# Patient Record
Sex: MALE | Race: WHITE | NOT HISPANIC OR LATINO | Employment: FULL TIME | URBAN - METROPOLITAN AREA
[De-identification: names, ages, dates, MRNs, and addresses within clinical notes are randomized per-mention and may not be internally consistent; named-entity substitution may affect disease eponyms.]

---

## 2017-05-08 ENCOUNTER — HOSPITAL ENCOUNTER (EMERGENCY)
Facility: HOSPITAL | Age: 32
Discharge: HOME | End: 2017-05-08
Attending: EMERGENCY MEDICINE | Admitting: EMERGENCY MEDICINE
Payer: COMMERCIAL

## 2017-05-08 ENCOUNTER — APPOINTMENT (EMERGENCY)
Dept: RADIOLOGY | Facility: HOSPITAL | Age: 32
End: 2017-05-08
Payer: COMMERCIAL

## 2017-05-08 VITALS
SYSTOLIC BLOOD PRESSURE: 129 MMHG | RESPIRATION RATE: 18 BRPM | OXYGEN SATURATION: 97 % | HEART RATE: 88 BPM | BODY MASS INDEX: 25.77 KG/M2 | TEMPERATURE: 98.9 F | DIASTOLIC BLOOD PRESSURE: 62 MMHG | HEIGHT: 70 IN | WEIGHT: 180 LBS

## 2017-05-08 DIAGNOSIS — S39.012A: Primary | ICD-10-CM

## 2017-05-08 PROCEDURE — 96375 TX/PRO/DX INJ NEW DRUG ADDON: CPT

## 2017-05-08 PROCEDURE — 99284 EMERGENCY DEPT VISIT MOD MDM: CPT

## 2017-05-08 PROCEDURE — 96361 HYDRATE IV INFUSION ADD-ON: CPT

## 2017-05-08 PROCEDURE — 96374 THER/PROPH/DIAG INJ IV PUSH: CPT

## 2017-05-08 PROCEDURE — 72131 CT LUMBAR SPINE W/O DYE: CPT

## 2017-05-08 RX ORDER — NAPROXEN 500 MG/1
500 TABLET ORAL 2 TIMES DAILY WITH MEALS
Qty: 10 TABLET | Refills: 0 | Status: SHIPPED | OUTPATIENT
Start: 2017-05-08 | End: 2019-01-31 | Stop reason: ALTCHOICE

## 2017-05-08 RX ORDER — OXYCODONE HYDROCHLORIDE AND ACETAMINOPHEN 5; 325 MG/1; MG/1
1 TABLET ORAL EVERY 6 HOURS PRN
Qty: 10 TABLET | Refills: 0 | Status: SHIPPED | OUTPATIENT
Start: 2017-05-08 | End: 2017-05-11

## 2017-05-08 RX ORDER — KETOROLAC TROMETHAMINE 30 MG/ML
30 INJECTION, SOLUTION INTRAMUSCULAR; INTRAVENOUS ONCE
Status: COMPLETED | OUTPATIENT
Start: 2017-05-08 | End: 2017-05-08

## 2017-05-08 RX ADMIN — SODIUM CHLORIDE 1000 ML: 0.9 INJECTION, SOLUTION INTRAVENOUS at 12:33

## 2017-05-08 RX ADMIN — KETOROLAC TROMETHAMINE 30 MG: 30 INJECTION, SOLUTION INTRAMUSCULAR at 12:36

## 2017-05-08 RX ADMIN — HYDROMORPHONE HYDROCHLORIDE 1 MG: 1 INJECTION, SOLUTION INTRAMUSCULAR; INTRAVENOUS; SUBCUTANEOUS at 12:37

## 2017-05-11 ENCOUNTER — HOSPITAL ENCOUNTER (EMERGENCY)
Facility: HOSPITAL | Age: 32
Discharge: HOME | End: 2017-05-11
Payer: COMMERCIAL

## 2017-05-11 ENCOUNTER — APPOINTMENT (EMERGENCY)
Dept: RADIOLOGY | Facility: HOSPITAL | Age: 32
End: 2017-05-11
Payer: COMMERCIAL

## 2017-05-11 VITALS
HEIGHT: 70 IN | BODY MASS INDEX: 25.77 KG/M2 | SYSTOLIC BLOOD PRESSURE: 158 MMHG | HEART RATE: 81 BPM | WEIGHT: 180 LBS | RESPIRATION RATE: 18 BRPM | DIASTOLIC BLOOD PRESSURE: 82 MMHG | TEMPERATURE: 98.6 F | OXYGEN SATURATION: 97 %

## 2017-05-11 DIAGNOSIS — S32.018A OTHER CLOSED FRACTURE OF FIRST LUMBAR VERTEBRA, INITIAL ENCOUNTER (HCC): Primary | ICD-10-CM

## 2017-05-11 DIAGNOSIS — S32.009A FRACTURE OF LUMBAR SPINE (HCC): ICD-10-CM

## 2017-05-11 LAB
ALBUMIN SERPL BCP-MCNC: 4.5 G/DL (ref 3.5–5)
ALP SERPL-CCNC: 80 U/L (ref 46–116)
ALT SERPL W P-5'-P-CCNC: 40 U/L (ref 12–78)
ANION GAP SERPL CALCULATED.3IONS-SCNC: 10 MMOL/L (ref 4–13)
APTT PPP: 30 SECONDS (ref 24–33)
AST SERPL W P-5'-P-CCNC: 18 U/L (ref 5–45)
BASOPHILS # BLD AUTO: 0 THOUSANDS/ΜL (ref 0–0.1)
BASOPHILS NFR BLD AUTO: 1 % (ref 0–1)
BILIRUB SERPL-MCNC: 0.6 MG/DL (ref 0.2–1)
BUN SERPL-MCNC: 10 MG/DL (ref 5–25)
CALCIUM SERPL-MCNC: 9.1 MG/DL (ref 8.3–10.1)
CHLORIDE SERPL-SCNC: 104 MMOL/L (ref 100–108)
CO2 SERPL-SCNC: 26 MMOL/L (ref 21–32)
CREAT SERPL-MCNC: 0.79 MG/DL (ref 0.6–1.3)
EOSINOPHIL # BLD AUTO: 0.2 THOUSAND/ΜL (ref 0–0.61)
EOSINOPHIL NFR BLD AUTO: 3 % (ref 0–6)
ERYTHROCYTE [DISTWIDTH] IN BLOOD BY AUTOMATED COUNT: 14 % (ref 11.6–15.1)
GFR SERPL CREATININE-BSD FRML MDRD: >60 ML/MIN/1.73SQ M
GLUCOSE SERPL-MCNC: 96 MG/DL (ref 65–140)
HCT VFR BLD AUTO: 46.4 % (ref 42–52)
HGB BLD-MCNC: 15.6 G/DL (ref 14–18)
INR PPP: 1.08 (ref 0.86–1.16)
LYMPHOCYTES # BLD AUTO: 1.2 THOUSANDS/ΜL (ref 0.6–4.47)
LYMPHOCYTES NFR BLD AUTO: 22 % (ref 14–44)
MCH RBC QN AUTO: 30.3 PG (ref 27–31)
MCHC RBC AUTO-ENTMCNC: 33.6 G/DL (ref 31.4–37.4)
MCV RBC AUTO: 90 FL (ref 82–98)
MONOCYTES # BLD AUTO: 0.4 THOUSAND/ΜL (ref 0.17–1.22)
MONOCYTES NFR BLD AUTO: 8 % (ref 4–12)
NEUTROPHILS # BLD AUTO: 3.5 THOUSANDS/ΜL (ref 1.85–7.62)
NEUTS SEG NFR BLD AUTO: 66 % (ref 43–75)
NRBC BLD AUTO-RTO: 0 /100 WBCS
PLATELET # BLD AUTO: 254 THOUSANDS/UL (ref 130–400)
PMV BLD AUTO: 9.5 FL (ref 8.9–12.7)
POTASSIUM SERPL-SCNC: 4.1 MMOL/L (ref 3.5–5.3)
PROT SERPL-MCNC: 7.5 G/DL (ref 6.4–8.2)
PROTHROMBIN TIME: 11.4 SECONDS (ref 9.4–11.7)
RBC # BLD AUTO: 5.15 MILLION/UL (ref 4.7–6.1)
SODIUM SERPL-SCNC: 140 MMOL/L (ref 136–145)
WBC # BLD AUTO: 5.3 THOUSAND/UL (ref 4.8–10.8)

## 2017-05-11 PROCEDURE — 80053 COMPREHEN METABOLIC PANEL: CPT | Performed by: PHYSICIAN ASSISTANT

## 2017-05-11 PROCEDURE — 96374 THER/PROPH/DIAG INJ IV PUSH: CPT

## 2017-05-11 PROCEDURE — 99284 EMERGENCY DEPT VISIT MOD MDM: CPT

## 2017-05-11 PROCEDURE — 85610 PROTHROMBIN TIME: CPT | Performed by: PHYSICIAN ASSISTANT

## 2017-05-11 PROCEDURE — 36415 COLL VENOUS BLD VENIPUNCTURE: CPT | Performed by: PHYSICIAN ASSISTANT

## 2017-05-11 PROCEDURE — 85025 COMPLETE CBC W/AUTO DIFF WBC: CPT | Performed by: PHYSICIAN ASSISTANT

## 2017-05-11 PROCEDURE — 74177 CT ABD & PELVIS W/CONTRAST: CPT

## 2017-05-11 PROCEDURE — 96361 HYDRATE IV INFUSION ADD-ON: CPT

## 2017-05-11 PROCEDURE — 96375 TX/PRO/DX INJ NEW DRUG ADDON: CPT

## 2017-05-11 PROCEDURE — 85730 THROMBOPLASTIN TIME PARTIAL: CPT | Performed by: PHYSICIAN ASSISTANT

## 2017-05-11 RX ORDER — KETOROLAC TROMETHAMINE 30 MG/ML
30 INJECTION, SOLUTION INTRAMUSCULAR; INTRAVENOUS ONCE
Status: COMPLETED | OUTPATIENT
Start: 2017-05-11 | End: 2017-05-11

## 2017-05-11 RX ORDER — IBUPROFEN 800 MG/1
800 TABLET ORAL EVERY 6 HOURS PRN
Qty: 30 TABLET | Refills: 0 | Status: SHIPPED | OUTPATIENT
Start: 2017-05-11 | End: 2019-01-31 | Stop reason: ALTCHOICE

## 2017-05-11 RX ORDER — ONDANSETRON 4 MG/1
4 TABLET, ORALLY DISINTEGRATING ORAL EVERY 8 HOURS PRN
Qty: 20 TABLET | Refills: 0 | Status: SHIPPED | OUTPATIENT
Start: 2017-05-11 | End: 2019-01-31 | Stop reason: ALTCHOICE

## 2017-05-11 RX ORDER — ONDANSETRON 2 MG/ML
4 INJECTION INTRAMUSCULAR; INTRAVENOUS ONCE
Status: COMPLETED | OUTPATIENT
Start: 2017-05-11 | End: 2017-05-11

## 2017-05-11 RX ADMIN — KETOROLAC TROMETHAMINE 30 MG: 30 INJECTION, SOLUTION INTRAMUSCULAR at 11:52

## 2017-05-11 RX ADMIN — IOHEXOL 100 ML: 350 INJECTION, SOLUTION INTRAVENOUS at 12:19

## 2017-05-11 RX ADMIN — ONDANSETRON 4 MG: 2 INJECTION INTRAMUSCULAR; INTRAVENOUS at 11:51

## 2017-05-11 RX ADMIN — SODIUM CHLORIDE 1000 ML: 0.9 INJECTION, SOLUTION INTRAVENOUS at 11:19

## 2017-05-17 ENCOUNTER — GENERIC CONVERSION - ENCOUNTER (OUTPATIENT)
Dept: OTHER | Facility: OTHER | Age: 32
End: 2017-05-17

## 2017-05-17 ENCOUNTER — ALLSCRIPTS OFFICE VISIT (OUTPATIENT)
Dept: OTHER | Facility: OTHER | Age: 32
End: 2017-05-17

## 2018-01-13 VITALS
DIASTOLIC BLOOD PRESSURE: 78 MMHG | SYSTOLIC BLOOD PRESSURE: 130 MMHG | WEIGHT: 180 LBS | BODY MASS INDEX: 25.77 KG/M2 | HEIGHT: 70 IN

## 2018-12-14 ENCOUNTER — OFFICE VISIT (OUTPATIENT)
Dept: FAMILY MEDICINE CLINIC | Facility: CLINIC | Age: 33
End: 2018-12-14
Payer: COMMERCIAL

## 2018-12-14 VITALS
DIASTOLIC BLOOD PRESSURE: 70 MMHG | TEMPERATURE: 97.8 F | SYSTOLIC BLOOD PRESSURE: 142 MMHG | WEIGHT: 213 LBS | HEART RATE: 92 BPM | BODY MASS INDEX: 30.56 KG/M2 | RESPIRATION RATE: 14 BRPM

## 2018-12-14 DIAGNOSIS — Z79.899 DRUG THERAPY: ICD-10-CM

## 2018-12-14 DIAGNOSIS — L30.8 OTHER ECZEMA: ICD-10-CM

## 2018-12-14 DIAGNOSIS — F10.20 ALCOHOL DEPENDENCE, CONTINUOUS (HCC): ICD-10-CM

## 2018-12-14 DIAGNOSIS — Z13.220 LIPID SCREENING: ICD-10-CM

## 2018-12-14 DIAGNOSIS — K42.9 UMBILICAL HERNIA WITHOUT OBSTRUCTION AND WITHOUT GANGRENE: ICD-10-CM

## 2018-12-14 DIAGNOSIS — F41.8 DEPRESSION WITH ANXIETY: Primary | ICD-10-CM

## 2018-12-14 DIAGNOSIS — F10.982 ALCOHOL-INDUCED INSOMNIA (HCC): ICD-10-CM

## 2018-12-14 DIAGNOSIS — E55.9 VITAMIN D DEFICIENCY: ICD-10-CM

## 2018-12-14 DIAGNOSIS — Z77.018 HEAVY METAL EXPOSURE: ICD-10-CM

## 2018-12-14 DIAGNOSIS — Z13.1 DIABETES MELLITUS SCREENING: ICD-10-CM

## 2018-12-14 DIAGNOSIS — Z13.0 SCREENING FOR DEFICIENCY ANEMIA: ICD-10-CM

## 2018-12-14 DIAGNOSIS — F43.10 PTSD (POST-TRAUMATIC STRESS DISORDER): ICD-10-CM

## 2018-12-14 PROCEDURE — 99203 OFFICE O/P NEW LOW 30 MIN: CPT | Performed by: NURSE PRACTITIONER

## 2018-12-14 RX ORDER — BETAMETHASONE DIPROPIONATE 0.5 MG/G
CREAM TOPICAL 2 TIMES DAILY
Qty: 30 G | Refills: 0 | Status: SHIPPED | OUTPATIENT
Start: 2018-12-14 | End: 2019-09-03 | Stop reason: SDUPTHER
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
## 2018-12-14 NOTE — LETTER
December 14, 2018     Patient: Velia Weiner   YOB: 1985   Date of Visit: 12/14/2018       To Whom it May Concern:    Titi Staples is under my professional care  He was seen in my office on 12/14/2018  He may return to work on 12/17/18  Excused 12/12/18 and 12/14/18  If you have any questions or concerns, please don't hesitate to call           Sincerely,          MEILSA Roque        CC: No Recipients

## 2019-01-31 ENCOUNTER — CONSULT (OUTPATIENT)
Dept: SURGERY | Facility: CLINIC | Age: 34
End: 2019-01-31
Payer: COMMERCIAL

## 2019-01-31 VITALS
SYSTOLIC BLOOD PRESSURE: 138 MMHG | BODY MASS INDEX: 32.05 KG/M2 | HEART RATE: 80 BPM | WEIGHT: 216.4 LBS | HEIGHT: 69 IN | DIASTOLIC BLOOD PRESSURE: 88 MMHG

## 2019-01-31 DIAGNOSIS — K42.9 UMBILICAL HERNIA WITHOUT OBSTRUCTION AND WITHOUT GANGRENE: ICD-10-CM

## 2019-01-31 DIAGNOSIS — Z01.818 PREOPERATIVE EXAMINATION: Primary | ICD-10-CM

## 2019-01-31 PROCEDURE — 99244 OFF/OP CNSLTJ NEW/EST MOD 40: CPT | Performed by: SURGERY

## 2019-01-31 RX ORDER — CEFAZOLIN SODIUM 2 G/50ML
2000 SOLUTION INTRAVENOUS ONCE
Status: CANCELLED | OUTPATIENT
Start: 2019-01-31 | End: 2019-01-31

## 2019-01-31 NOTE — H&P
[facility] History and Physical Note:    Assessment:  Umbilical hernia, symptomatic  Plan:  Open umbilical hernia repair  Chief Complaint:  I am here for the hernia  HPI  Patient is a very pleasant 16-year-old male who reported a bad coughing episode a couple years ago and felt a pop at his umbilicus  Reports a slowly enlarging bulge at his umbilicus since then  He works at a [employer] and lifts heavy materials  He notices some aching at the umbilical hernia site at the end of the day  A CT scan performed in [facility] revealed umbilical hernia with a small fascial defect  Patient tolerated a regular diet with normal bowel function  Has no recent illnesses or previous abdominal surgeries  PMH:  Past Medical History:   Diagnosis Date    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     Onset date: 7/30/2010     Lyme disease     Onset date: 2/1/1795     Uncomplicated alcohol abuse     Resolved 9/24/2014        PSH:  Past Surgical History:   Procedure Laterality Date    APPENDECTOMY      SINUS SURGERY      TONSILLECTOMY         Home Meds:  Current Outpatient Prescriptions on File Prior to Visit   Medication Sig Dispense Refill    betamethasone, augmented, (DIPROLENE-AF) 0 05 % cream Apply topically 2 (two) times a day 30 g 0    [DISCONTINUED] ibuprofen (MOTRIN) 800 mg tablet Take 1 tablet by mouth every 6 (six) hours as needed for mild pain for up to 30 days 30 tablet 0    [DISCONTINUED] naproxen (NAPROSYN) 500 mg tablet Take 1 tablet by mouth 2 (two) times a day with meals for 5 days 10 tablet 0    [DISCONTINUED] ondansetron (ZOFRAN-ODT) 4 mg disintegrating tablet Take 1 tablet by mouth every 8 (eight) hours as needed for nausea or vomiting for up to 30 days 20 tablet 0     No current facility-administered medications on file prior to visit          Allergies:  No Known Allergies    Social Hx:  Social History     Social History    Marital status: Single     Spouse name: N/A    Number of children: N/A    Years of education: N/A     Occupational History    Not on file  Social History Main Topics    Smoking status: Current Every Day Smoker     Packs/day: 1 00    Smokeless tobacco: Never Used    Alcohol use 4 2 oz/week     7 Shots of liquor per week    Drug use: No    Sexual activity: Not on file     Other Topics Concern    Not on file     Social History Narrative    History of cigarette smoker - As per [employer]    Former smoker - As per [employer]    Marijuana - As per [employer]         Family Hx:    No family history on file  Review of Systems   Constitutional: Negative  HENT: Negative  Eyes: Negative  Respiratory:        Viral upper respiratory infection 2 weeks ago   Cardiovascular: Negative  Gastrointestinal: Negative  Endocrine: Negative  Genitourinary: Negative  Musculoskeletal: Negative  Skin: Negative  Allergic/Immunologic: Negative  Neurological: Negative  Hematological: Negative  Psychiatric/Behavioral: Negative  /88 (BP Location: Left arm, Patient Position: Sitting, Cuff Size: Adult)   Pulse 80   Ht 5' 9" (1 753 m)   Wt 98 2 kg (216 lb 6 4 oz)   BMI 31 96 kg/m²      Physical Exam   Constitutional: He is oriented to person, place, and time  He appears well-developed and well-nourished  No distress  HENT:   Head: Normocephalic and atraumatic  Eyes: Pupils are equal, round, and reactive to light  Conjunctivae are normal    Neck: Normal range of motion  Neck supple  Cardiovascular: Normal rate and regular rhythm  No murmur heard  Pulmonary/Chest: Effort normal and breath sounds normal  No respiratory distress  Abdominal: Soft  Bowel sounds are normal  He exhibits no distension  There is no tenderness  Small umbilical hernia with omentum  Only partially reducible  Musculoskeletal: Normal range of motion  Lymphadenopathy:     He has no cervical adenopathy     Neurological: He is alert and oriented to person, place, and time  Skin: Skin is warm and dry  Psychiatric: He has a normal mood and affect   His behavior is normal        Pertinent labs reviewed    Pertinent images and available reads personally reviewed    Pertinent notes reviewed       Stacy Wilhelm MD [id number] [facility]  [phone]

## 2019-01-31 NOTE — PROGRESS NOTES
[facility] History and Physical Note:    Assessment:  Umbilical hernia, symptomatic  Plan:  Open umbilical hernia repair  Chief Complaint:  I am here for the hernia  HPI  Patient is a very pleasant 41-year-old male who reported a bad coughing episode a couple years ago and felt a pop at his umbilicus  Reports a slowly enlarging bulge at his umbilicus since then  He works at a [employer] and lifts heavy materials  He notices some aching at the umbilical hernia site at the end of the day  A CT scan performed in 4090 revealed umbilical hernia with a small fascial defect  Patient tolerated a regular diet with normal bowel function  Has no recent illnesses or previous abdominal surgeries  PMH:  Past Medical History:   Diagnosis Date    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     Onset date: 7/30/2010     Lyme disease     Onset date: 0/6/6099     Uncomplicated alcohol abuse     Resolved 9/24/2014        PSH:  Past Surgical History:   Procedure Laterality Date    APPENDECTOMY      SINUS SURGERY      TONSILLECTOMY         Home Meds:  Current Outpatient Prescriptions on File Prior to Visit   Medication Sig Dispense Refill    betamethasone, augmented, (DIPROLENE-AF) 0 05 % cream Apply topically 2 (two) times a day 30 g 0    [DISCONTINUED] ibuprofen (MOTRIN) 800 mg tablet Take 1 tablet by mouth every 6 (six) hours as needed for mild pain for up to 30 days 30 tablet 0    [DISCONTINUED] naproxen (NAPROSYN) 500 mg tablet Take 1 tablet by mouth 2 (two) times a day with meals for 5 days 10 tablet 0    [DISCONTINUED] ondansetron (ZOFRAN-ODT) 4 mg disintegrating tablet Take 1 tablet by mouth every 8 (eight) hours as needed for nausea or vomiting for up to 30 days 20 tablet 0     No current facility-administered medications on file prior to visit          Allergies:  No Known Allergies    Social Hx:  Social History     Social History    Marital status: Single     Spouse name: N/A    Number of children: N/A    Years of education: N/A     Occupational History    Not on file  Social History Main Topics    Smoking status: Current Every Day Smoker     Packs/day: 1 00    Smokeless tobacco: Never Used    Alcohol use 4 2 oz/week     7 Shots of liquor per week    Drug use: No    Sexual activity: Not on file     Other Topics Concern    Not on file     Social History Narrative    History of cigarette smoker - As per [employer]    Former smoker - As per [employer]    Marijuana - As per [employer]         Family Hx:    No family history on file  Review of Systems   Constitutional: Negative  HENT: Negative  Eyes: Negative  Respiratory:        Viral upper respiratory infection 2 weeks ago   Cardiovascular: Negative  Gastrointestinal: Negative  Endocrine: Negative  Genitourinary: Negative  Musculoskeletal: Negative  Skin: Negative  Allergic/Immunologic: Negative  Neurological: Negative  Hematological: Negative  Psychiatric/Behavioral: Negative  /88 (BP Location: Left arm, Patient Position: Sitting, Cuff Size: Adult)   Pulse 80   Ht 5' 9" (1 753 m)   Wt 98 2 kg (216 lb 6 4 oz)   BMI 31 96 kg/m²     Physical Exam   Constitutional: He is oriented to person, place, and time  He appears well-developed and well-nourished  No distress  HENT:   Head: Normocephalic and atraumatic  Eyes: Pupils are equal, round, and reactive to light  Conjunctivae are normal    Neck: Normal range of motion  Neck supple  Cardiovascular: Normal rate and regular rhythm  No murmur heard  Pulmonary/Chest: Effort normal and breath sounds normal  No respiratory distress  Abdominal: Soft  Bowel sounds are normal  He exhibits no distension  There is no tenderness  Small umbilical hernia with omentum  Only partially reducible  Musculoskeletal: Normal range of motion  Lymphadenopathy:     He has no cervical adenopathy     Neurological: He is alert and oriented to person, place, and time  Skin: Skin is warm and dry  Psychiatric: He has a normal mood and affect   His behavior is normal        Pertinent labs reviewed    Pertinent images and available reads personally reviewed    Pertinent notes reviewed       Mayelin Washington MD [address] [facility]  [phone]

## 2019-02-02 ENCOUNTER — TELEPHONE (OUTPATIENT)
Dept: FAMILY MEDICINE CLINIC | Facility: CLINIC | Age: 34
End: 2019-02-02

## 2019-02-04 LAB
25(OH)D3+25(OH)D2 SERPL-MCNC: 15.1 NG/ML (ref 30–100)
ALBUMIN SERPL-MCNC: 5.2 G/DL (ref 3.5–5.5)
ALBUMIN/GLOB SERPL: 2.1 {RATIO} (ref 1.2–2.2)
ALP SERPL-CCNC: 70 IU/L (ref 39–117)
ALT SERPL-CCNC: 122 IU/L (ref 0–44)
ARSENIC BLD-MCNC: 6 UG/L (ref 2–23)
AST SERPL-CCNC: 83 IU/L (ref 0–40)
BILIRUB SERPL-MCNC: 0.7 MG/DL (ref 0–1.2)
BUN SERPL-MCNC: 11 MG/DL (ref 6–20)
BUN/CREAT SERPL: 13 (ref 9–20)
CALCIUM SERPL-MCNC: 10.3 MG/DL (ref 8.7–10.2)
CHLORIDE SERPL-SCNC: 100 MMOL/L (ref 96–106)
CHOLEST SERPL-MCNC: 236 MG/DL (ref 100–199)
CO2 SERPL-SCNC: 22 MMOL/L (ref 20–29)
CREAT SERPL-MCNC: 0.86 MG/DL (ref 0.76–1.27)
ERYTHROCYTE [DISTWIDTH] IN BLOOD BY AUTOMATED COUNT: 13.9 % (ref 12.3–15.4)
GLOBULIN SER-MCNC: 2.5 G/DL (ref 1.5–4.5)
GLUCOSE SERPL-MCNC: 118 MG/DL (ref 65–99)
HCT VFR BLD AUTO: 48 % (ref 37.5–51)
HDLC SERPL-MCNC: 84 MG/DL
HGB BLD-MCNC: 16.3 G/DL (ref 13–17.7)
LABCORP COMMENT: NORMAL
LDLC SERPL CALC-MCNC: 119 MG/DL (ref 0–99)
LEAD BLD-MCNC: NORMAL UG/DL (ref 0–4)
MCH RBC QN AUTO: 32.5 PG (ref 26.6–33)
MCHC RBC AUTO-ENTMCNC: 34 G/DL (ref 31.5–35.7)
MCV RBC AUTO: 96 FL (ref 79–97)
MERCURY BLD-MCNC: 1.1 UG/L (ref 0–14.9)
MICRODELETION SYND BLD/T FISH: NORMAL
PLATELET # BLD AUTO: 230 X10E3/UL (ref 150–379)
POTASSIUM SERPL-SCNC: 4.3 MMOL/L (ref 3.5–5.2)
PROT SERPL-MCNC: 7.7 G/DL (ref 6–8.5)
RBC # BLD AUTO: 5.02 X10E6/UL (ref 4.14–5.8)
SL AMB EGFR AFRICAN AMERICAN: 131 ML/MIN/1.73
SL AMB EGFR NON AFRICAN AMERICAN: 113 ML/MIN/1.73
SL AMB VLDL CHOLESTEROL CALC: 33 MG/DL (ref 5–40)
SODIUM SERPL-SCNC: 142 MMOL/L (ref 134–144)
TRIGL SERPL-MCNC: 163 MG/DL (ref 0–149)
TSH SERPL DL<=0.005 MIU/L-ACNC: 1.95 UIU/ML (ref 0.45–4.5)
WBC # BLD AUTO: 5.2 X10E3/UL (ref 3.4–10.8)

## 2019-02-13 ENCOUNTER — OFFICE VISIT (OUTPATIENT)
Dept: URGENT CARE | Facility: CLINIC | Age: 34
End: 2019-02-13
Payer: COMMERCIAL

## 2019-02-13 VITALS
HEART RATE: 82 BPM | WEIGHT: 214.8 LBS | HEIGHT: 70 IN | DIASTOLIC BLOOD PRESSURE: 70 MMHG | TEMPERATURE: 100.2 F | OXYGEN SATURATION: 100 % | BODY MASS INDEX: 30.75 KG/M2 | RESPIRATION RATE: 16 BRPM | SYSTOLIC BLOOD PRESSURE: 120 MMHG

## 2019-02-13 DIAGNOSIS — R68.89 INFLUENZA-LIKE SYMPTOMS: Primary | ICD-10-CM

## 2019-02-13 PROCEDURE — 99213 OFFICE O/P EST LOW 20 MIN: CPT | Performed by: PHYSICIAN ASSISTANT

## 2019-02-13 NOTE — PATIENT INSTRUCTIONS
Influenza   WHAT YOU NEED TO KNOW:   Influenza (the flu) is an infection caused by the influenza virus  The flu is easily spread when an infected person coughs, sneezes, or has close contact with others  You may be able to spread the flu to others for 1 week or longer after signs or symptoms appear  DISCHARGE INSTRUCTIONS:   Call 911 for any of the following:   · You have trouble breathing, and your lips look purple or blue  · You have a seizure  Return to the emergency department if:   · You are dizzy, or you are urinating less or not at all  · You have a headache with a stiff neck, and you feel tired or confused  · You have new pain or pressure in your chest     · Your symptoms, such as shortness of breath, vomiting, or diarrhea, get worse  · Your symptoms, such as fever and coughing, seem to get better, but then get worse  Contact your healthcare provider if:   · You have new muscle pain or weakness  · You have questions or concerns about your condition or care  Medicines: You may need any of the following:  · Acetaminophen  decreases pain and fever  It is available without a doctor's order  Ask how much to take and how often to take it  Follow directions  Acetaminophen can cause liver damage if not taken correctly  · NSAIDs , such as ibuprofen, help decrease swelling, pain, and fever  This medicine is available with or without a doctor's order  NSAIDs can cause stomach bleeding or kidney problems in certain people  If you take blood thinner medicine, always ask your healthcare provider if NSAIDs are safe for you  Always read the medicine label and follow directions  · Antivirals  help fight a viral infection  · Take your medicine as directed  Contact your healthcare provider if you think your medicine is not helping or if you have side effects  Tell him or her if you are allergic to any medicine  Keep a list of the medicines, vitamins, and herbs you take   Include the amounts, and when and why you take them  Bring the list or the pill bottles to follow-up visits  Carry your medicine list with you in case of an emergency  Rest  as much as you can to help you recover  Drink liquids as directed  to help prevent dehydration  Ask how much liquid to drink each day and which liquids are best for you  Prevent the spread of influenza:   · Wash your hands often  Use soap and water  Wash your hands after you use the bathroom, change a child's diapers, or sneeze  Wash your hands before you prepare or eat food  Use gel hand cleanser when soap and water are not available  Do not touch your eyes, nose, or mouth unless you have washed your hands first            · Cover your mouth when you sneeze or cough  Cough into a tissue or the bend of your arm  · Clean shared items with a germ-killing   Clean table surfaces, doorknobs, and light switches  Do not share towels, silverware, and dishes with people who are sick  Wash bed sheets, towels, silverware, and dishes with soap and water  · Wear a mask  over your mouth and nose if you are sick or are near anyone who is sick  · Stay away from others  if you are sick  · Influenza vaccine  helps prevent influenza (flu)  Everyone older than 6 months should get a yearly influenza vaccine  Get the vaccine as soon as it is available, usually in September or October each year  Follow up with your healthcare provider as directed:  Write down your questions so you remember to ask them during your visits  © 2017 [employer] [employer] Information is for End User's use only and may not be sold, redistributed or otherwise used for commercial purposes  All illustrations and images included in CareNotes® are the copyrighted property of [employer] , [employer]  or [employer]  The above information is an  only  It is not intended as medical advice for individual conditions or treatments   Talk to your doctor, nurse or pharmacist before following any medical regimen to see if it is safe and effective for you

## 2019-02-13 NOTE — LETTER
February 13, 2019     Patient: Brent Castro   YOB: 1985   Date of Visit: 2/13/2019       To Whom it May Concern:    Nevaeh Richmond was seen in my clinic on 2/13/2019  He may return to work on 2/18/2019  If you have any questions or concerns, please don't hesitate to call           Sincerely,          Toño Barros PA-C        CC: No Recipients

## 2019-02-13 NOTE — PROGRESS NOTES
[facility]        NAME: Maxine Silverman is a 28 y o  male  : 1985    MRN: [id number]  DATE: 2019  TIME: 9:14 AM    Assessment and Plan   Influenza-like symptoms [R68 89]  1  Influenza-like symptoms           Patient Instructions     Discussed condition with patient  His presentation strongly resembles influenza  We discussed options which include starting him on Tamiflu versus swabbing him for flu versus conservative management  The patient has already had symptoms for 2 days and he has a significant mental health history so we agreed to treat him for flu with combination of hydration, rest, fever management, use of OTC cold meds for symptomatic relief, quarantine for up to 5 days, and wearing mask if must go out  He will not be started on Tamiflu  Follow up with PCP in 3-5 days  Proceed to  ER if symptoms worsen  Chief Complaint     Chief Complaint   Patient presents with    Influenza     temp at Work Monday 102 2 body aches, chest congestion         History of Present Illness       Pt presents with onset two days ago of fever, chills, body aches, mildly productive cough, nasal congestion, diarrhea  Denies ST, N/V  His appetite is decreased  He has taken Advil  Denies hx of asthma/allergies  Does smoke 1 ppd  He does not receive the flu shot  Review of Systems   Review of Systems   Constitutional: Positive for chills and fever  HENT: Positive for congestion  Negative for sore throat  Respiratory: Positive for cough  Cardiovascular: Negative  Gastrointestinal: Positive for diarrhea  Negative for nausea and vomiting  Genitourinary: Negative  Musculoskeletal: Positive for myalgias           Current Medications       Current Outpatient Medications:     betamethasone, augmented, (DIPROLENE-AF) 0 05 % cream, Apply topically 2 (two) times a day, Disp: 30 g, Rfl: 0    Current Allergies     Allergies as of 2019    (No Known Allergies)            The following portions of the patient's history were reviewed and updated as appropriate: allergies, current medications, past family history, past medical history, past social history, past surgical history and problem list      Past Medical History:   Diagnosis Date    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     Onset date: 7/30/2010     Lyme disease     Onset date: 8/1/8384     Uncomplicated alcohol abuse     Resolved 9/24/2014        Past Surgical History:   Procedure Laterality Date    APPENDECTOMY      SINUS SURGERY      TONSILLECTOMY         History reviewed  No pertinent family history  Medications have been verified  Objective   /70   Pulse 82   Temp 100 2 °F (37 9 °C)   Resp 16   Ht 5' 9 5" (1 765 m)   Wt 97 4 kg (214 lb 12 8 oz)   SpO2 100%   BMI 31 27 kg/m²        Physical Exam     Physical Exam   Constitutional: He is oriented to person, place, and time  He appears well-developed and well-nourished  No distress  Patient appears ill   HENT:   Right Ear: Hearing, tympanic membrane, external ear and ear canal normal    Left Ear: Hearing, tympanic membrane, external ear and ear canal normal    Nose: Mucosal edema (Bilateral boggy turbinates) present  Mouth/Throat: Mucous membranes are normal  Posterior oropharyngeal erythema ( PND) present  No oropharyngeal exudate  Neck: Neck supple  Cardiovascular: Normal rate, regular rhythm and normal heart sounds  Pulmonary/Chest: Effort normal and breath sounds normal    Lymphadenopathy:     He has no cervical adenopathy  Neurological: He is alert and oriented to person, place, and time  Psychiatric: He has a normal mood and affect  Vitals reviewed

## 2019-05-09 ENCOUNTER — TELEPHONE (OUTPATIENT)
Dept: FAMILY MEDICINE CLINIC | Facility: CLINIC | Age: 34
End: 2019-05-09

## 2019-05-09 PROBLEM — F33.41 RECURRENT MAJOR DEPRESSIVE DISORDER, IN PARTIAL REMISSION (HCC): Status: ACTIVE | Noted: 2019-05-09

## 2019-08-12 ENCOUNTER — APPOINTMENT (OUTPATIENT)
Dept: RADIOLOGY | Facility: CLINIC | Age: 34
End: 2019-08-12
Payer: COMMERCIAL

## 2019-08-12 ENCOUNTER — OFFICE VISIT (OUTPATIENT)
Dept: URGENT CARE | Facility: CLINIC | Age: 34
End: 2019-08-12
Payer: COMMERCIAL

## 2019-08-12 VITALS
SYSTOLIC BLOOD PRESSURE: 140 MMHG | HEART RATE: 96 BPM | OXYGEN SATURATION: 96 % | TEMPERATURE: 100.4 F | RESPIRATION RATE: 18 BRPM | DIASTOLIC BLOOD PRESSURE: 80 MMHG | HEIGHT: 69 IN | BODY MASS INDEX: 33.06 KG/M2 | WEIGHT: 223.2 LBS

## 2019-08-12 DIAGNOSIS — M79.672 LEFT FOOT PAIN: Primary | ICD-10-CM

## 2019-08-12 DIAGNOSIS — M79.672 LEFT FOOT PAIN: ICD-10-CM

## 2019-08-12 PROCEDURE — 73630 X-RAY EXAM OF FOOT: CPT

## 2019-08-12 PROCEDURE — 99213 OFFICE O/P EST LOW 20 MIN: CPT | Performed by: FAMILY MEDICINE

## 2019-08-12 RX ORDER — DIPHENHYDRAMINE HCL 25 MG
25 TABLET ORAL EVERY 6 HOURS PRN
COMMUNITY
End: 2021-07-01

## 2019-08-12 NOTE — LETTER
August 12, 2019     Patient: Keith Second   YOB: 1985   Date of Visit: 8/12/2019       To Whom It May Concern:    Alonso Drop was evaluated here on 08/12/2019  Please excuse is absence  If you have any questions or concerns, please don't hesitate to call           Sincerely,        Agatha Joe MD    CC: No Recipients

## 2019-08-12 NOTE — PROGRESS NOTES
[facility]        NAME: Aixa Campbell is a 27 y o  male  : 1985    MRN: [id number]  DATE: 2019  TIME: 1:34 PM    Assessment and Plan   Left foot pain [M79 672]  1  Left foot pain  XR foot 3+ vw left     X-ray does not appear to show fracture of the 5th metatarsal   Tenderness concerning for chronic soft tissue injury from tight fitting boots  Patient encouraged to replace with better fitting shoes and to ice the area of pain  Will refer to Podiatry for further evaluation management for persistent symptoms  Patient Instructions     Follow up with PCP in 3-5 days  Proceed to  ER if symptoms worsen  Chief Complaint     Chief Complaint   Patient presents with    Foot Pain     L foot pain x 2 months - denies trauma/injury  Pain lateral L foot with occ  swelling  Using ice - no OTC meds  History of Present Illness       79-year-old male presents today due to 2 months of left foot pain  Denies any obvious trauma to the foot; unsure of the cause  Reports standing on concrete floors a lot while at work and wonders if pain is associated with that  Has a constant pain every day which is worsened after returning from work  Pain is usually 5/10 at that time  Has been icing to improved pain  Review of Systems   Review of Systems   Constitutional: Negative for chills and fever  Musculoskeletal: Positive for arthralgias and gait problem  Negative for joint swelling  Skin: Positive for color change (Mild ecchymosis)  Neurological: Positive for numbness (Left lateral foot)           Current Medications       Current Outpatient Medications:     betamethasone, augmented, (DIPROLENE-AF) 0 05 % cream, Apply topically 2 (two) times a day, Disp: 30 g, Rfl: 0    diphenhydrAMINE (BENADRYL) 25 mg tablet, Take 25 mg by mouth every 6 (six) hours as needed for itching, Disp: , Rfl:     Current Allergies     Allergies as of 2019    (No Known Allergies)            The following portions of the patient's history were reviewed and updated as appropriate: allergies, current medications, past family history, past medical history, past social history, past surgical history and problem list      Past Medical History:   Diagnosis Date    Anxiety     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     Onset date: 7/30/2010     Eczema     Lyme disease     Onset date: 6/0/0117     Uncomplicated alcohol abuse     Resolved 9/24/2014        Past Surgical History:   Procedure Laterality Date    APPENDECTOMY      SINUS SURGERY      TONSILLECTOMY         No family history on file  Medications have been verified  Objective   /80 (BP Location: Right arm, Patient Position: Sitting, Cuff Size: Large)   Pulse 96   Temp 100 4 °F (38 °C) (Tympanic)   Resp 18   Ht 5' 9" (1 753 m)   Wt 101 kg (223 lb 3 2 oz)   SpO2 96%   BMI 32 96 kg/m²        Physical Exam     Physical Exam   Constitutional: He is oriented to person, place, and time  He appears well-developed and well-nourished  He appears distressed (Moderate left foot pain)  HENT:   Head: Normocephalic and atraumatic  Eyes: Conjunctivae are normal    Pulmonary/Chest: Effort normal    Musculoskeletal: Normal range of motion  He exhibits tenderness (Point tenderness over the dorsal aspect of the 5th metatarsal midshaft )  He exhibits no edema or deformity  Neurological: He is alert and oriented to person, place, and time  Skin: Skin is warm  He is not diaphoretic  No erythema  Psychiatric: He has a normal mood and affect  His behavior is normal  Judgment and thought content normal    Nursing note and vitals reviewed

## 2019-09-03 ENCOUNTER — OFFICE VISIT (OUTPATIENT)
Dept: FAMILY MEDICINE CLINIC | Facility: CLINIC | Age: 34
End: 2019-09-03
Payer: COMMERCIAL

## 2019-09-03 VITALS
HEART RATE: 100 BPM | RESPIRATION RATE: 16 BRPM | DIASTOLIC BLOOD PRESSURE: 98 MMHG | HEIGHT: 69 IN | WEIGHT: 222.2 LBS | TEMPERATURE: 98.6 F | SYSTOLIC BLOOD PRESSURE: 150 MMHG | BODY MASS INDEX: 32.91 KG/M2

## 2019-09-03 DIAGNOSIS — E55.9 VITAMIN D DEFICIENCY: ICD-10-CM

## 2019-09-03 DIAGNOSIS — F10.20 ALCOHOL DEPENDENCE, CONTINUOUS (HCC): ICD-10-CM

## 2019-09-03 DIAGNOSIS — H66.001 NON-RECURRENT ACUTE SUPPURATIVE OTITIS MEDIA OF RIGHT EAR WITHOUT SPONTANEOUS RUPTURE OF TYMPANIC MEMBRANE: ICD-10-CM

## 2019-09-03 DIAGNOSIS — J01.00 ACUTE NON-RECURRENT MAXILLARY SINUSITIS: Primary | ICD-10-CM

## 2019-09-03 DIAGNOSIS — L30.8 OTHER ECZEMA: ICD-10-CM

## 2019-09-03 PROCEDURE — 3008F BODY MASS INDEX DOCD: CPT | Performed by: NURSE PRACTITIONER

## 2019-09-03 PROCEDURE — 99213 OFFICE O/P EST LOW 20 MIN: CPT | Performed by: NURSE PRACTITIONER

## 2019-09-03 RX ORDER — BETAMETHASONE DIPROPIONATE 0.5 MG/G
CREAM TOPICAL 2 TIMES DAILY
Qty: 30 G | Refills: 0 | Status: SHIPPED | OUTPATIENT
Start: 2019-09-03 | End: 2021-06-22

## 2019-09-03 RX ORDER — AMOXICILLIN AND CLAVULANATE POTASSIUM 875; 125 MG/1; MG/1
1 TABLET, FILM COATED ORAL EVERY 12 HOURS SCHEDULED
Qty: 14 TABLET | Refills: 0 | Status: SHIPPED | OUTPATIENT
Start: 2019-09-03 | End: 2019-09-10

## 2019-09-03 RX ORDER — ERGOCALCIFEROL 1.25 MG/1
50000 CAPSULE ORAL WEEKLY
Qty: 12 CAPSULE | Refills: 2 | Status: SHIPPED | OUTPATIENT
Start: 2019-09-03 | End: 2021-06-22 | Stop reason: SDUPTHER

## 2019-09-03 NOTE — PROGRESS NOTES
Assessment:      Acute bacterial sinusitis    right AOM  Vit d deficiency on blood work  Alcohol abuse with elevated LFTs    The case discussed with patient using patient centered shared decision making  The patient was counseled regarding instructions for management,-- risk factor reductions,-- prognosis,-- impressions,-- risks and benefits of treatment options,-- importance of compliance with treatment  I have reviewed the instructions with the patient, answering all questions to his satisfaction  Plan:      1  flonase  2  Augmentin  3  Nasal saline rinses as needed for congestion  4  Follow-up with PCP in 1 week if symptoms worsen or persist       Vit d supplement ordered    BMI Counseling: Body mass index is 32 81 kg/m²  Discussed the patient's BMI with him  The BMI is above average  No BMI follow-up plan is appropriate  Patient refused follow-up plan  Tobacco Cessation Counseling: Tobacco cessation counseling and education was provided  The patient is sincerely urged to quit consumption of tobacco  He is not ready to quit tobacco  The numerous health risks of tobacco consumption were discussed  If he decides to quit, there are a number of helpful adjunctive aids, and he can see me to discuss nicotine replacement therapy, chantix, or bupropion anytime in the future  Discussed consequences of continued tobacco and etoh abuse with patient and mother  Patient gives verbal understanding that his decision to not follow provider recommendations related to self-care, medications, and follow-up may endanger his  health or life  Subjective:       Val Balderas is a 27 y o  male who presents for evaluation of possible sinus infection  Symptoms include right ear pressure/pain, achiness, congestion, facial pain, hand/face or pedal edema, low grade fever, nasal congestion and sinus pressure  Onset of symptoms was 1 week ago, rapidly worsening since that time  He is not drinking much    Still drinking > 10 shots of hard liqueur per night, heavy smoking  Past history is significant for no history of pneumonia or bronchitis  Denies sick contacts  Wanted to review labs from 1/2019  "I never got results"  I reviewed chart and advised that we tried to contact him to review, left messages, pt never returned calls  Labs reviewed  The following portions of the patient's history were reviewed and updated as appropriate: allergies, current medications, past family history, past medical history, past social history, past surgical history and problem list     Review of Systems  Pertinent items are noted in HPI        Objective:      /98 (BP Location: Left arm, Patient Position: Sitting, Cuff Size: Large)   Pulse 100   Temp 98 6 °F (37 °C)   Resp 16   Ht 5' 9" (1 753 m)   Wt 101 kg (222 lb 3 2 oz)   BMI 32 81 kg/m²   General appearance: alert and oriented, in no acute distress  Head: Normocephalic, without obvious abnormality, sinuses tender to percussion  Ears: abnormal TM right ear - erythematous and bulging and abnormal TM left ear - dull  Nose: yellow discharge, turbinates red, swollen  Throat: abnormal findings: mild oropharyngeal erythema  Lungs: clear to auscultation bilaterally  Heart: regular rate and rhythm, S1, S2 normal, no murmur, click, rub or gallop  Lymph nodes: Cervical adenopathy: +

## 2019-09-23 NOTE — PATIENT INSTRUCTIONS
Abuse of Alcohol   AMBULATORY CARE:   Alcohol abuse   · is unhealthy drinking behavior  You may drink too much at one time once a week, or continue to drink too much daily  You continue to drink even though it causes problems  The problems can be alcohol related legal problems or problems with work or family  · If you drink too much at one time, you are binge drinking  Binge drinking is when you have a large amount of alcohol in a short time  Your blood alcohol concentrations (TALISHA) goes above 0 08 g/dLlevel during binge drinking  For men, this usually happens with more than 4 drinks in 2 hours  For women, it is more than 3 drinks in 2 hours  A drink is 12 ounces of beer, 4 ounces of wine, or 1½ ounces of liquor  Common signs and symptoms of alcohol abuse include:  Each person that abuses alcohol may have different symptoms  The following are common signs and symptoms of alcohol abuse:  · Loss of interest in activities, work, and school    · Decreased interest in family and friends    · Depression    · Constant thoughts about drinking    · Not able to control the amount you drink    · Restlessness, or erratic and violent behavior  Call 911 for any of the following:   · You have sudden chest pain or trouble breathing  · You have a seizure or have shaking or trembling  · You feel like you could harm yourself or others  · You were in an accident because of alcohol  Seek care immediately if:   · You have hallucinations (you see or hear things that are not real)  · You cannot stop vomiting or you vomit blood  Contact your healthcare provider if:   · You need help to stop drinking alcohol  · You have questions or concerns about your condition or care    Long-term effects of alcohol abuse:   · Blackouts    · Memory loss    · Dementia    · Liver disease    · Thiamine (vitamin B1) deficiency  Treatment for alcohol abuse  may include the following:  · Detoxification (detox) and withdrawal  is a program that helps you to safely get alcohol out of your body  Detox can also help get rid of the physical need to drink  Healthcare providers monitor the physical symptoms of withdrawal  They may give you medicines to help decrease nausea, dehydration, and seizures  Healthcare providers will also monitor your blood pressure, heart and breathing rates, and your temperature  Symptoms of anxiety, depression, and suicidal thoughts are also monitored and managed during detox  Healthcare providers may give you medicines for these symptoms and therapy sessions will be available to you  Detox is usually done at a detox center or in a hospital  Healthcare providers do not recommend that you try to detox at home or by yourself  Withdrawal symptoms may become life threatening  The center can help you find 12 step programs or an individual therapist to help with emotional support after detox  · Inpatient and outpatient treatment  focus on your personal needs to help you stop drinking  Treatment helps you understand the reasons you abuse alcohol  Counselors and therapists provide you with support and help you find ways to cope instead of drinking  You may need inpatient treatment to provide a controlled environment  You may need outpatient treatment after your inpatient treatment is complete  · Alcohol aversion therapy  takes away the desire to drink by causing a negative reaction when you drink  Healthcare providers may give you medicines that cause nausea and vomiting when you drink alcohol  They may instead give you a medicine that decreases your urge to drink alcohol  These medicines are used to help you stop drinking or reduce the amount you drink  They can also help you avoid relapse  Risks of alcohol abuse:  Alcohol abuse increases your risk for gastrointestinal cancers, brain damage and problems with your immune system  It also increases your risk for heart, kidney, and lung damage   The risk of stroke increases with alcohol abuse  If you are pregnant and drink alcohol, you and your baby are at risk for serious health problems  Avoid alcohol:  You should stop drinking entirely  Alcohol can damage your brain, heart, and liver  It also increases your risk for injury, high blood pressure, and certain types of cancer  Alcohol is dangerous when you combine it with certain medicines  Do not drive if you drink alcohol:  Make sure someone who has not been drinking can help you get home  Get support:  Most people need support to stop drinking alcohol  Mental health providers, support groups, rehabilitation centers, and your healthcare provider can provide support  For more information:   · Alcoholics Anonymous  Web Address: http://yoonew/  · Substance Abuse and [address] [place]  Web Address: https://.com/  Follow up with your healthcare provider as directed:  Write down your questions so you remember to ask them during your visits  © 2017 [address] Information is for End User's use only and may not be sold, redistributed or otherwise used for commercial purposes  All illustrations and images included in CareNotes® are the copyrighted property of [employer] , [employer]  or [employer]  The above information is an  only  It is not intended as medical advice for individual conditions or treatments  Talk to your doctor, nurse or pharmacist before following any medical regimen to see if it is safe and effective for you  Anxiety   AMBULATORY CARE:   Anxiety  is a condition that causes you to feel extremely worried or nervous  The feelings are so strong that they can cause problems with your daily activities or sleep  Anxiety may be triggered by something you fear, or it may happen without a cause  Family or work stress, smoking, caffeine, and alcohol can increase your risk for anxiety   Certain medicines or health conditions can also increase your risk  Anxiety can become a long-term condition if it is not managed or treated  Common signs and symptoms that may occur with anxiety:   · Fatigue or muscle tightness     · Shaking, restlessness, or irritability     · Problems focusing     · Trouble sleeping     · Feeling jumpy, easily startled, or dizzy     · Rapid heartbeat or shortness of breath  Call 911 if:   · You have chest pain, tightness, or heaviness that may spread to your shoulders, arms, jaw, neck, or back  · You feel like hurting yourself or someone else  Contact your healthcare provider if:   · Your symptoms get worse or do not get better with treatment  · You think your medicine may be causing side effects  · Your anxiety keeps you from doing your regular daily activities  · You have new symptoms since your last visit  · You have questions or concerns about your condition or care  Treatment for anxiety  may include medicines to help you feel calm and relaxed, and decrease your symptoms  Medicines are usually given together with therapy or other treatments  Manage anxiety:   · Talk to someone about your anxiety  Your healthcare provider may suggest counseling  Cognitive behavioral therapy can help you understand and change how you react to events that trigger your symptoms  You might feel more comfortable talking with a friend or family member about your anxiety  Choose someone you know will be supportive and encouraging  · Find ways to relax  Activities such as exercise, meditation, or listening to music can help you relax  Spend time with friends, or do things you enjoy  · Practice deep breathing  Deep breathing can help you relax when you feel anxious  Focus on taking slow, deep breaths several times a day, or during an anxiety attack  Breathe in through your nose and out through your mouth  · Create a regular sleep routine  Regular sleep can help you feel calmer during the day   Go to sleep and wake up at the same times every day  Do not watch television or use the computer right before bed  Your room should be comfortable, dark, and quiet  · Eat a variety of healthy foods  Healthy foods include fruits, vegetables, low-fat dairy products, lean meats, fish, whole-grain breads, and cooked beans  Healthy foods can help you feel less anxious and have more energy  · Exercise regularly  Exercise can increase your energy level  Exercise may also lift your mood and help you sleep better  Your healthcare provider can help you create an exercise plan  · Do not smoke  Nicotine and other chemicals in cigarettes and cigars can increase anxiety  Ask your healthcare provider for information if you currently smoke and need help to quit  E-cigarettes or smokeless tobacco still contain nicotine  Talk to your healthcare provider before you use these products  · Do not have caffeine  Caffeine can make your symptoms worse  Do not have foods or drinks that are meant to increase your energy level  · Limit or do not drink alcohol  Ask your healthcare provider if alcohol is safe for you  You may not be able to drink alcohol if you take certain anxiety or depression medicines  Limit alcohol to 1 drink per day if you are a woman  Limit alcohol to 2 drinks per day if you are a man  A drink of alcohol is 12 ounces of beer, 5 ounces of wine, or 1½ ounces of liquor  · Do not use drugs  Drugs can make your anxiety worse  It can also make anxiety hard to manage  Talk to your healthcare provider if you use drugs and want help to quit  Follow up with your healthcare provider as directed:  Write down your questions so you remember to ask them during your visits  © 2017 [address] Information is for End User's use only and may not be sold, redistributed or otherwise used for commercial purposes   All illustrations and images included in CareNotes® are the copyrighted property of [employer]  or [employer]  The above information is an  only  It is not intended as medical advice for individual conditions or treatments  Talk to your doctor, nurse or pharmacist before following any medical regimen to see if it is safe and effective for you  Below Average

## 2020-03-03 ENCOUNTER — TELEPHONE (OUTPATIENT)
Dept: SURGERY | Facility: CLINIC | Age: 35
End: 2020-03-03

## 2021-05-24 ENCOUNTER — OFFICE VISIT (OUTPATIENT)
Dept: FAMILY MEDICINE CLINIC | Facility: CLINIC | Age: 36
End: 2021-05-24
Payer: COMMERCIAL

## 2021-05-24 VITALS
RESPIRATION RATE: 16 BRPM | HEIGHT: 70 IN | OXYGEN SATURATION: 98 % | SYSTOLIC BLOOD PRESSURE: 136 MMHG | TEMPERATURE: 98.7 F | DIASTOLIC BLOOD PRESSURE: 90 MMHG | HEART RATE: 90 BPM | BODY MASS INDEX: 33.64 KG/M2 | WEIGHT: 235 LBS

## 2021-05-24 DIAGNOSIS — R21 RASH: Primary | ICD-10-CM

## 2021-05-24 DIAGNOSIS — E78.5 HYPERLIPIDEMIA, UNSPECIFIED HYPERLIPIDEMIA TYPE: ICD-10-CM

## 2021-05-24 DIAGNOSIS — K42.9 UMBILICAL HERNIA WITHOUT OBSTRUCTION AND WITHOUT GANGRENE: ICD-10-CM

## 2021-05-24 DIAGNOSIS — R03.0 ELEVATED BP WITHOUT DIAGNOSIS OF HYPERTENSION: ICD-10-CM

## 2021-05-24 DIAGNOSIS — E55.9 VITAMIN D DEFICIENCY: ICD-10-CM

## 2021-05-24 PROCEDURE — 99214 OFFICE O/P EST MOD 30 MIN: CPT | Performed by: FAMILY MEDICINE

## 2021-05-24 RX ORDER — METHYLPREDNISOLONE 4 MG/1
TABLET ORAL
Qty: 21 EACH | Refills: 0 | Status: SHIPPED | OUTPATIENT
Start: 2021-05-24 | End: 2021-06-22

## 2021-05-24 NOTE — PROGRESS NOTES
Luna Newton 1985 male MRN: [id number]    FAMILY PRACTICE OFFICE VISIT  [facility] - [address]      ASSESSMENT/PLAN  Luna Newton is a 38 y o  male presents to the office for    Diagnoses and all orders for this visit:    Rash  -     Ambulatory referral to Dermatology; Future  -     methylPREDNISolone 4 MG tablet therapy pack; Use as directed on package    Umbilical hernia without obstruction and without gangrene    Vitamin D deficiency  -     Vitamin D 25 hydroxy; Future    Hyperlipidemia, unspecified hyperlipidemia type  -     Lipid panel; Future    BMI 34 0-34 9,adult  -     TSH, 3rd generation with Free T4 reflex; Future  -     Comprehensive metabolic panel; Future  -     CBC and differential; Future    Elevated BP without diagnosis of hypertension    Rash can be from eczema versus contact dermatitis verses alcoholism  Trying to get the patient in with Dermatology to see if we can get a biopsy of that area prior to starting the steroids  Elevated BP monitor  Elevated BMI discussed  Hx of HLD-> Lipid repeat  Will reach out to  for ETOH abuse help  Repeat VIT D    BMI Counseling: Body mass index is 34 21 kg/m²  The BMI is above normal  Nutrition recommendations include decreasing portion sizes, consuming healthier snacks and limiting drinks that contain sugar  Exercise recommendations include exercising 3-5 times per week  Depression Screening and Follow-up Plan: Patient's depression screening was positive with a PHQ-2 score of 4  Their PHQ-9 score was 10  Patient assessed for underlying major depression  Brief counseling provided and recommend additional follow-up/re-evaluation next office visit  Tobacco Cessation Counseling: Tobacco cessation counseling was provided  No future appointments         SUBJECTIVE  CC: Establish Care (patient here to establish care and has red rash for 2 months progressively getting worse) and Rash      HPI:  Vijay Haile is a 38 y o  male who presents for an acute appointment  History of ETOH abuse  14 + shots at bedtime to sleep of  Whisky  Used to drink vodka and beer  But now is only drinking whiskey  Patient states that he presents today because he has a rash that only presents itself in the summertime  Was diagnosed with eczema and was given topical creams with no improvement  Patient's mom comes in today and is very concerned about this rash worsening  Patient deals with multiple chemicals at work [employer];   patient states that he is hard to talk an open up and therefore feels like therapy will not help him  He has had depression for quite some time now and this is been since childhood  Patient is an alcoholic and fears to come to the appointments  That is why he has never had his blood work performed since 2016  Review of Systems   Constitutional: Negative for activity change, appetite change, chills, fatigue and fever  HENT: Negative for congestion  Respiratory: Negative for cough, chest tightness and shortness of breath  Cardiovascular: Negative for chest pain and leg swelling  Gastrointestinal: Negative for abdominal distention, abdominal pain, constipation, diarrhea, nausea and vomiting  Skin: Positive for rash  Psychiatric/Behavioral: Positive for sleep disturbance  Depressed     All other systems reviewed and are negative        Historical Information   The patient history was reviewed as follows:  Past Medical History:   Diagnosis Date    Anxiety     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     Onset date: 7/30/2010     Eczema     Lyme disease     Onset date: 3/5/7052     Uncomplicated alcohol abuse     Resolved 9/24/2014          Medications:     Current Outpatient Medications:     diphenhydrAMINE (BENADRYL) 25 mg tablet, Take 25 mg by mouth every 6 (six) hours as needed for itching, Disp: , Rfl:     betamethasone, augmented, (DIPROLENE-AF) 0 05 % cream, Apply topically 2 (two) times a day (Patient not taking: Reported on 5/24/2021), Disp: 30 g, Rfl: 0    ergocalciferol (VITAMIN D2) 50,000 units, Take 1 capsule (50,000 Units total) by mouth once a week (Patient not taking: Reported on 5/24/2021), Disp: 12 capsule, Rfl: 2    methylPREDNISolone 4 MG tablet therapy pack, Use as directed on package, Disp: 21 each, Rfl: 0    No Known Allergies    OBJECTIVE  Vitals:   Vitals:    05/24/21 1532   BP: 136/90   BP Location: Left arm   Patient Position: Sitting   Cuff Size: Large   Pulse: 90   Resp: 16   Temp: 98 7 °F (37 1 °C)   TempSrc: Temporal   SpO2: 98%   Weight: 107 kg (235 lb)   Height: 5' 9 5" (1 765 m)         Physical Exam  Vitals signs reviewed  Constitutional:       Appearance: He is well-developed  HENT:      Head: Normocephalic and atraumatic  Eyes:      Conjunctiva/sclera: Conjunctivae normal       Pupils: Pupils are equal, round, and reactive to light  Neck:      Musculoskeletal: Normal range of motion and neck supple  Cardiovascular:      Rate and Rhythm: Normal rate and regular rhythm  Heart sounds: Normal heart sounds  Pulmonary:      Effort: Pulmonary effort is normal  No respiratory distress  Breath sounds: Normal breath sounds  Abdominal:      General: Abdomen is flat  Bowel sounds are normal       Hernia: A hernia (Umbilical hernia) is present  Musculoskeletal: Normal range of motion  Skin:     General: Skin is warm  Capillary Refill: Capillary refill takes less than 2 seconds  Findings: Rash (Dry skin of b/l arms and neck line) present  Neurological:      Mental Status: He is alert and oriented to person, place, and time  Psychiatric:         Mood and Affect: Mood normal          Behavior: Behavior normal          Thought Content:  Thought content normal          Judgment: Judgment normal                     Naif Jane MD,   [facility]  5/25/2021

## 2021-05-25 ENCOUNTER — TELEPHONE (OUTPATIENT)
Dept: DERMATOLOGY | Facility: CLINIC | Age: 36
End: 2021-05-25

## 2021-05-25 ENCOUNTER — PATIENT OUTREACH (OUTPATIENT)
Dept: FAMILY MEDICINE CLINIC | Facility: CLINIC | Age: 36
End: 2021-05-25

## 2021-05-25 ENCOUNTER — TELEPHONE (OUTPATIENT)
Dept: FAMILY MEDICINE CLINIC | Facility: CLINIC | Age: 36
End: 2021-05-25

## 2021-05-25 DIAGNOSIS — F10.10 ALCOHOL ABUSE: Primary | ICD-10-CM

## 2021-05-25 NOTE — PROGRESS NOTES
Did you get this appt scheduled ?  I called to leave a messge at the office but so far noone has called back and they are downstairs today Tc/cma

## 2021-05-25 NOTE — TELEPHONE ENCOUNTER
PHYSICALLY WENT DOWNSTAIRS AND SCHEDULED AN APPT  FOR TOMORROW AT 1:30PM I CALLED PT AND LEFT A DETAILED MSG   AND ALSO CALLED HIS MOTHER AND ADVISED SAME

## 2021-05-25 NOTE — PROGRESS NOTES
SW received inbasket message from Dr Terri Martinez in regards to concerns about pt's drinking and seeking a therapist      [facility] reviewed chart  SW informed Dr Terri Martinez pt may benefit from the [facility]  This is the site    [email or web address]    SW called pt to completed assessment and inquire about the concern  Reached voicemail and left message introducing self and role in care management  Requested call back  Will continue to attempt to reach pt

## 2021-05-25 NOTE — TELEPHONE ENCOUNTER
----- Message from Walter Melchor MD sent at 5/25/2021  9:42 AM EDT -----  Please try to get this patient in with dermatology   Call his mother

## 2021-05-25 NOTE — TELEPHONE ENCOUNTER
----- Message from Kirk Alcala MD sent at 5/25/2021  9:42 AM EDT -----  Please try to get this patient in with dermatology   Call his mother

## 2021-05-25 NOTE — TELEPHONE ENCOUNTER
Left vm to guerrero for asap appt for [facility] office  Dee Dee Sutton jd

## 2021-05-26 ENCOUNTER — OFFICE VISIT (OUTPATIENT)
Dept: DERMATOLOGY | Age: 36
End: 2021-05-26
Payer: COMMERCIAL

## 2021-05-26 VITALS — TEMPERATURE: 99.9 F | WEIGHT: 232.2 LBS | BODY MASS INDEX: 33.24 KG/M2 | HEIGHT: 70 IN

## 2021-05-26 DIAGNOSIS — D48.5 NEOPLASM OF UNCERTAIN BEHAVIOR OF SKIN: Primary | ICD-10-CM

## 2021-05-26 DIAGNOSIS — Z84.0 FAMILY HISTORY OF CUTANEOUS LUPUS: ICD-10-CM

## 2021-05-26 DIAGNOSIS — L56.8 PHOTODERMATITIS: ICD-10-CM

## 2021-05-26 PROCEDURE — 88313 SPECIAL STAINS GROUP 2: CPT | Performed by: STUDENT IN AN ORGANIZED HEALTH CARE EDUCATION/TRAINING PROGRAM

## 2021-05-26 PROCEDURE — 88305 TISSUE EXAM BY PATHOLOGIST: CPT | Performed by: STUDENT IN AN ORGANIZED HEALTH CARE EDUCATION/TRAINING PROGRAM

## 2021-05-26 PROCEDURE — 11104 PUNCH BX SKIN SINGLE LESION: CPT | Performed by: DERMATOLOGY

## 2021-05-26 PROCEDURE — 88350 IMFLUOR EA ADDL 1ANTB STN PX: CPT | Performed by: DERMATOLOGY

## 2021-05-26 PROCEDURE — 88300 SURGICAL PATH GROSS: CPT | Performed by: STUDENT IN AN ORGANIZED HEALTH CARE EDUCATION/TRAINING PROGRAM

## 2021-05-26 PROCEDURE — 4004F PT TOBACCO SCREEN RCVD TLK: CPT | Performed by: DERMATOLOGY

## 2021-05-26 PROCEDURE — 11105 PUNCH BX SKIN EA SEP/ADDL: CPT | Performed by: DERMATOLOGY

## 2021-05-26 PROCEDURE — 99204 OFFICE O/P NEW MOD 45 MIN: CPT | Performed by: DERMATOLOGY

## 2021-05-26 PROCEDURE — 88346 IMFLUOR 1ST 1ANTB STAIN PX: CPT | Performed by: DERMATOLOGY

## 2021-05-26 RX ORDER — PREDNISONE 20 MG/1
TABLET ORAL
Qty: 21 TABLET | Refills: 0 | Status: SHIPPED | OUTPATIENT
Start: 2021-05-26 | End: 2021-06-01 | Stop reason: SDUPTHER

## 2021-05-26 NOTE — PROGRESS NOTES
[facility] Note     Patient Name: Brent Castro  Encounter Date: 05/26/2021     Have you been cared for by a [facility] Dermatologist in the last 3 years and, if so, which one? No    · Have you traveled outside of the [facility] in the past 3 months? No     May we call your Preferred Phone number to discuss your specific medical information? Yes     May we leave a detailed message that includes your specific medical information? Yes      Today's Chief Concerns:   Concern #1:  Rash face arms and neck       Past Medical History:  Have you personally ever had or currently have any of the following? · Skin cancer (such as Melanoma, Basal Cell Carcinoma, Squamous Cell Carcinoma? (If Yes, please provide more detail)- No  · Eczema: YES  · Psoriasis: No  · HIV/AIDS: No  · Hepatitis B or C: No  · Tuberculosis: No  · Systemic Immunosuppression such as Diabetes, Biologic or Immunotherapy, Chemotherapy, Organ Transplantation, Bone Marrow Transplantation (If YES, please provide more detail): No  · Radiation Treatment (If YES, please provide more detail): No  · Any other major medical conditions/concerns? (If Yes, which types)- No    Social History:    What is/was your primary occupation? [email or web address] at [employer] are your hobbies/past-times? X box     Family history:    Have any of your "first degree relatives" (parent, brother, sister, or child) had any of the following       · Skin cancer such as Melanoma or Merkel Cell Carcinoma or Pancreatic Cancer? No  · Eczema, Asthma, Hay Fever or Seasonal Allergies: YES, mother eczema   · Psoriasis or Psoriatic Arthritis: No  · Do any other medical conditions seem to run in your family? If Yes, what condition and which relatives?   YES, lupus and heart disease  -Mother     Current Medications (update all dermatological medications before printing patient's AVS):    Current Outpatient Medications:     diphenhydrAMINE (BENADRYL) 25 mg tablet, Take 25 mg by mouth every 6 (six) hours as needed for itching, Disp: , Rfl:     methylPREDNISolone 4 MG tablet therapy pack, Use as directed on package, Disp: 21 each, Rfl: 0    betamethasone, augmented, (DIPROLENE-AF) 0 05 % cream, Apply topically 2 (two) times a day (Patient not taking: Reported on 5/24/2021), Disp: 30 g, Rfl: 0    ergocalciferol (VITAMIN D2) 50,000 units, Take 1 capsule (50,000 Units total) by mouth once a week (Patient not taking: Reported on 5/24/2021), Disp: 12 capsule, Rfl: 2      Review of Systems/System Alerts:  Have you recently had or currently have any of the following? If YES, what are you doing for the problem? · Fever, chills or unintended weight loss: No  · Sudden loss or change in your vision: No  · Nausea, vomiting or blood in your stool: No  · Painful or swollen joints: No  · Wheezing or cough: No  · Changing mole or non-healing wound: YES, eczema on hands   · Nosebleeds: No  · Excessive sweating: No  · Easy or prolonged bleeding? No  · Over the last 2 weeks, how often have you been bothered by the following problems? · Taking little interest or pleasure in doing things: 2   · Feeling down, depressed, or hopeless: 2   · Rapid heartbeat with epinephrine:  No  · Any known allergies? No  No Known Allergies      PHYSICAL EXAM:       Was a chaperone (Derm Clinical Assistant) present throughout the entire Physical Exam? Yes     Did the Dermatology Team specifically  the patient on the importance of a Full Skin Exam to be sure that nothing is missed clinically?  Yes}  o Did the patient request or accept a Full Skin Exam?  NO  o Did the patient specifically refuse to have the areas "under-the-bra" examined by the Dermatologist? No  o Did the patient specifically refuse to have the areas "under-the-underwear" examined by the Dermatologist? No      CONSTITUTIONAL    Vitals:    05/26/21 1338   Weight: 105 kg (232 lb 3 2 oz)   Height: 5' 9 5" (1 765 m) PSYCH: Normal mood and affect  EYES: Normal conjunctiva  ENT: Normal lips and oral mucosa  CARDIOVASCULAR: No edema  RESPIRATORY: Normal respirations  HEME/LYMPH/IMMUNO:  No regional lymphadenopathy except as noted below in ASSESSMENT AND PLAN BY DIAGNOSIS    SKIN:  FULL ORGAN SYSTEM EXAM  Hair, Scalp, Ears, Face Normal except as noted below in Assessment   Neck, Cervical Chain Nodes Normal except as noted below in Assessment   Right Arm/Hand/Fingers Normal except as noted below in Assessment   Left Arm/Hand/Fingers Normal except as noted below in Assessment   Chest/Breasts/Axillae Viewed areas Normal except as noted below in Assessment   Abdomen, Umbilicus Normal except as noted below in Assessment   Back/Spine Normal except as noted below in Assessment   Groin/Genitalia/Buttocks NOT EXAMINED   Right Leg, Foot, Toes Normal except as noted below in Assessment   Left Leg, Foot, Toes Normal except as noted below in Assessment        ASSESSMENT AND PLAN BY DIAGNOSIS:    History of Present Condition:     Duration:  How long has this been an issue for you? o  4 years spring to late fall    Location Affected:  Where on the body is this affecting you?    o  face, neck and arms    Quality:  Is there any bleeding, pain, itch, burning/irritation, or redness associated with the skin lesion? o  itching    Severity:  Describe any bleeding, pain, itch, burning/irritation, or redness on a scale of 1 to 10 (with 10 being the worst)  o  8   Timing:  Does this condition seem to be there pretty constantly or do you notice it more at specific times throughout the day?     o  constant    Context:  Have you ever noticed that this condition seems to be associated with specific activities you do?    o  denies    Modifying Factors:    o Anything that seems to make the condition worse?    -  sunlight and sweating   o What have you tried to do to make the condition better?    -  steroids and skin cream    Associated Signs and Symptoms:  Does this skin lesion seem to be associated with any of the following:  o  SL AMB DERM SIGNS AND SYMPTOMS: Itching and Scratching     NEOPLASM OF UNCERTAIN BEHAVIOR OF SKIN    Physical Exam:   (Anatomic Location); (Size and Morphological Description); (Differential Diagnosis):   A: Left posterior neck; Skin; punch biopsy; 0 3 cm photo allergic contact rule out DMS rule out pellagra   B: Left posterior neck; Skin; punch biopsy; 0 3 cm photo allergic contact rule out DMS rule out pellagra IMMUNOFLUORESCENCE   Pertinent Positives:   Pertinent Negatives: Additional History of Present Condition:  No anemia, muscle weakness, joint pains; rash is only present in summer; is a rodriguez; limits sun exposure as much as possible; mom has lupus  Assessment and Plan:   I have discussed with the patient that a sample of skin via a "skin biopsy would be potentially helpful to further make a specific diagnosis under the microscope   Based on a thorough discussion of this condition and the management approach to it (including a comprehensive discussion of the known risks, side effects and potential benefits of treatment), the patient (family) agrees to implement the following specific plan:    o Procedure:  Skin Biopsy  After a thorough discussion of treatment options and risk/benefits/alternatives (including but not limited to local pain, scarring, dyspigmentation, blistering, possible superinfection, and inability to confirm a diagnosis via histopathology), verbal and written consent were obtained and portion of the rash was biopsied for tissue sample    See below for consent that was obtained from patient and subsequent Procedure Note   o Complete lab work   o Start Prednisone 60 mg one week, 40 mg one week, 20 mg one week       PROCEDURE NOTE:  PUNCH BIOPSY      Performing Physician: Dr Ames    Anatomic Location; Clinical Description with size (cm); Pre-Op Diagnosis:    A: Left posterior neck; Skin; punch biopsy; 0 3 cm photo allergic contact rule out DMS/GERRI rule out pellagra   B: Left posterior neck; Skin; punch biopsy; 0 3 cm photo allergic contact rule out DMS/GERRI rule out pellagra IMMUNOFLUORESCENCE       Anesthesia: 1% xylocaine with epi       Topical anesthesia: Aluminum chloride       Indications: To indicate diagnosis and management plan  Procedure Details     Patient informed of the risks (including bleeding,scaring and infection) and benefits of the procedure explained  Verbal and written informed consent obtained  The area was prepped and draped in the usual fashion  Anesthesia was obtained with 1% lidocaine with epinephrine  The skin was then stretched perpendicular to the skin tension lines and a punch biopsy to an appropriate sampling depth was obtained with a 3 mm punch with a forceps and iris scissors  Hemostasis was obtained with 4-0 Ethilon x 2 sutures  Complications:  None      Specimen has been sent for review by Dermatopathology  Plan:  1  Instructed to keep the wound dry and covered for 24-48h and clean thereafter  2  Warning signs of infection were reviewed  3  Recommended that the patient use acetaminophen as needed for pain  4  Sutures if any should be removed in 14 days      Standard post-procedure care has been explained and has been included in written form within the patient's copy of Informed Consent            Scribe Attestation    I,:  Rk Ryan am acting as a scribe while in the presence of the attending physician :       I,:  Barbara Patiño MD personally performed the services described in this documentation    as scribed in my presence :

## 2021-05-26 NOTE — PATIENT INSTRUCTIONS
NEOPLASM OF UNCERTAIN BEHAVIOR OF SKIN    Assessment and Plan:   I have discussed with the patient that a sample of skin via a "skin biopsy would be potentially helpful to further make a specific diagnosis under the microscope   Based on a thorough discussion of this condition and the management approach to it (including a comprehensive discussion of the known risks, side effects and potential benefits of treatment), the patient (family) agrees to implement the following specific plan:    o Procedure:  Skin Biopsy  After a thorough discussion of treatment options and risk/benefits/alternatives (including but not limited to local pain, scarring, dyspigmentation, blistering, possible superinfection, and inability to confirm a diagnosis via histopathology), verbal and written consent were obtained and portion of the rash was biopsied for tissue sample  See below for consent that was obtained from patient and subsequent Procedure Note   o Complete lab work   o Start Prednisone 60 mg one week, 40 mg one week, 20 mg one week           Plan:  1  Instructed to keep the wound dry and covered for 24-48h and clean thereafter  2  Warning signs of infection were reviewed  3  Recommended that the patient use acetaminophen as needed for pain  4  Sutures if any should be removed in 14 days      Standard post-procedure care has been explained and has been included in written form within the patient's copy of Informed Consent

## 2021-05-26 NOTE — LETTER
May 26, 2021     Patient: Luna Newton   YOB: 1985   Date of Visit: 5/26/2021       To Whom it May Concern:    Yarely Woo is under my professional care  He was seen in my office on 5/26/2021  He may return to work on 5/27/2021  If you have any questions or concerns, please don't hesitate to call           Sincerely,          Freddie Gifford MD        CC: No Recipients

## 2021-06-01 DIAGNOSIS — L56.8 PHOTODERMATITIS: ICD-10-CM

## 2021-06-01 RX ORDER — PREDNISONE 20 MG/1
TABLET ORAL
Qty: 42 TABLET | Refills: 0 | Status: SHIPPED | OUTPATIENT
Start: 2021-06-01 | End: 2021-06-22

## 2021-06-01 NOTE — TELEPHONE ENCOUNTER
Call from patient mother and pharmacy stating th quantity of prednisone is not enough for how the directions are written out

## 2021-06-02 NOTE — RESULT ENCOUNTER NOTE
Tissue Exam: [id number]  Order: [id number]  Status:  Final result   Visible to patient:  No (inaccessible in [address]) Dx:  Neoplasm of uncertain behavior of skin  Component   Case Report  Surgical Pathology Report                         Case: [id number]                                   Authorizing Provider: Jyothi Narvaez MD      Collected:           05/26/2021 1722              Ordering Location:     [facility]      Received:            05/26/2021 [address] [place] [address]                                                                   Pathologist:           Mulugeta Slaughter MD                                                           Specimens:   A) - Skin, Other, A: left posterior neck                                                            B) - Skin, Other, B: left posterior neck Immonofluorescence                              Final Diagnosis  A  Skin, left posterior neck, punch biopsy:     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 (see note)      Note: The histopathologic findings are non-specific; in the appropriate clinical context, the histopathologic findings are consistent with eczematous dermatitis, chronic actinic dermatitis, or allergic contact dermatitis  Clinical pathological correlation is advised  Pathogenic microorganisms are not seen on PAS stain  Multiple levels examined           B  Skin, left posterior neck, DIF:     Specimen entirely sent to [facility] for immunofluorescence analysis  A separate report will be issued by [facility] following completion of their studies  Electronically signed by Mulugeta Slaughter MD on 5/28/2021 at  8:22 PM        DERMATOPATHOLOGY RESULT NOTE    Results reviewed by ordering physician  Called patient to personally discuss results  No answer, left voicemail with result        Instructions for Clinical Derm Team:   (remember to route Result Note to appropriate staff):    None    Result & Plan by Specimen:    Specimen A: spongiotic dermatitis  Plan: await results of DIF, if negative, pursue photopatch testing      Specimen B: DIF  Plan: Pending

## 2021-06-03 LAB — MISCELLANEOUS LAB TEST RESULT: NORMAL

## 2021-06-07 ENCOUNTER — TELEPHONE (OUTPATIENT)
Dept: DERMATOLOGY | Facility: CLINIC | Age: 36
End: 2021-06-07

## 2021-06-07 ENCOUNTER — PATIENT OUTREACH (OUTPATIENT)
Dept: FAMILY MEDICINE CLINIC | Facility: CLINIC | Age: 36
End: 2021-06-07

## 2021-06-07 NOTE — TELEPHONE ENCOUNTER
Left voicemail for patient that immunofluorescence was also negative  The next step is for patch testing and photo patch testing  I have put in a referral request at [facility] for this  If he prefers he could also go to [facility]    If he would like a request sent there as well he should let me know

## 2021-06-10 ENCOUNTER — TELEPHONE (OUTPATIENT)
Dept: FAMILY MEDICINE CLINIC | Facility: CLINIC | Age: 36
End: 2021-06-10

## 2021-06-10 ENCOUNTER — APPOINTMENT (OUTPATIENT)
Dept: LAB | Facility: CLINIC | Age: 36
End: 2021-06-10
Payer: COMMERCIAL

## 2021-06-10 ENCOUNTER — OFFICE VISIT (OUTPATIENT)
Dept: DERMATOLOGY | Age: 36
End: 2021-06-10

## 2021-06-10 DIAGNOSIS — E78.5 HYPERLIPIDEMIA, UNSPECIFIED HYPERLIPIDEMIA TYPE: ICD-10-CM

## 2021-06-10 DIAGNOSIS — Z48.02 ENCOUNTER FOR REMOVAL OF SUTURES: Primary | ICD-10-CM

## 2021-06-10 DIAGNOSIS — E55.9 VITAMIN D DEFICIENCY: ICD-10-CM

## 2021-06-10 LAB
25(OH)D3 SERPL-MCNC: 9.8 NG/ML (ref 30–100)
ALBUMIN SERPL BCP-MCNC: 4.1 G/DL (ref 3.5–5)
ALP SERPL-CCNC: 59 U/L (ref 46–116)
ALT SERPL W P-5'-P-CCNC: 71 U/L (ref 12–78)
ANION GAP SERPL CALCULATED.3IONS-SCNC: 7 MMOL/L (ref 4–13)
AST SERPL W P-5'-P-CCNC: 34 U/L (ref 5–45)
BASOPHILS # BLD AUTO: 0.07 THOUSANDS/ΜL (ref 0–0.1)
BASOPHILS NFR BLD AUTO: 1 % (ref 0–1)
BILIRUB SERPL-MCNC: 0.76 MG/DL (ref 0.2–1)
BUN SERPL-MCNC: 11 MG/DL (ref 5–25)
CALCIUM SERPL-MCNC: 9 MG/DL (ref 8.3–10.1)
CHLORIDE SERPL-SCNC: 106 MMOL/L (ref 100–108)
CHOLEST SERPL-MCNC: 223 MG/DL (ref 50–200)
CO2 SERPL-SCNC: 25 MMOL/L (ref 21–32)
CREAT SERPL-MCNC: 0.73 MG/DL (ref 0.6–1.3)
EOSINOPHIL # BLD AUTO: 0.19 THOUSAND/ΜL (ref 0–0.61)
EOSINOPHIL NFR BLD AUTO: 2 % (ref 0–6)
ERYTHROCYTE [DISTWIDTH] IN BLOOD BY AUTOMATED COUNT: 13.6 % (ref 11.6–15.1)
GFR SERPL CREATININE-BSD FRML MDRD: 119 ML/MIN/1.73SQ M
GLUCOSE P FAST SERPL-MCNC: 118 MG/DL (ref 65–99)
HCT VFR BLD AUTO: 48.4 % (ref 36.5–49.3)
HDLC SERPL-MCNC: 82 MG/DL
HGB BLD-MCNC: 15.6 G/DL (ref 12–17)
IMM GRANULOCYTES # BLD AUTO: 0.18 THOUSAND/UL (ref 0–0.2)
IMM GRANULOCYTES NFR BLD AUTO: 2 % (ref 0–2)
LDLC SERPL CALC-MCNC: 121 MG/DL (ref 0–100)
LYMPHOCYTES # BLD AUTO: 3.16 THOUSANDS/ΜL (ref 0.6–4.47)
LYMPHOCYTES NFR BLD AUTO: 33 % (ref 14–44)
MCH RBC QN AUTO: 31.8 PG (ref 26.8–34.3)
MCHC RBC AUTO-ENTMCNC: 32.2 G/DL (ref 31.4–37.4)
MCV RBC AUTO: 99 FL (ref 82–98)
MONOCYTES # BLD AUTO: 0.83 THOUSAND/ΜL (ref 0.17–1.22)
MONOCYTES NFR BLD AUTO: 9 % (ref 4–12)
NEUTROPHILS # BLD AUTO: 5.27 THOUSANDS/ΜL (ref 1.85–7.62)
NEUTS SEG NFR BLD AUTO: 53 % (ref 43–75)
NONHDLC SERPL-MCNC: 141 MG/DL
NRBC BLD AUTO-RTO: 0 /100 WBCS
PLATELET # BLD AUTO: 243 THOUSANDS/UL (ref 149–390)
PMV BLD AUTO: 11.3 FL (ref 8.9–12.7)
POTASSIUM SERPL-SCNC: 3.8 MMOL/L (ref 3.5–5.3)
PROT SERPL-MCNC: 7.3 G/DL (ref 6.4–8.2)
RBC # BLD AUTO: 4.9 MILLION/UL (ref 3.88–5.62)
SODIUM SERPL-SCNC: 138 MMOL/L (ref 136–145)
TRIGL SERPL-MCNC: 101 MG/DL
TSH SERPL DL<=0.05 MIU/L-ACNC: 1.67 UIU/ML (ref 0.36–3.74)
WBC # BLD AUTO: 9.7 THOUSAND/UL (ref 4.31–10.16)

## 2021-06-10 PROCEDURE — 84591 ASSAY OF NOS VITAMIN: CPT | Performed by: DERMATOLOGY

## 2021-06-10 PROCEDURE — 80061 LIPID PANEL: CPT

## 2021-06-10 PROCEDURE — 80053 COMPREHEN METABOLIC PANEL: CPT

## 2021-06-10 PROCEDURE — 36415 COLL VENOUS BLD VENIPUNCTURE: CPT | Performed by: DERMATOLOGY

## 2021-06-10 PROCEDURE — 85025 COMPLETE CBC W/AUTO DIFF WBC: CPT

## 2021-06-10 PROCEDURE — 82306 VITAMIN D 25 HYDROXY: CPT

## 2021-06-10 PROCEDURE — RECHECK: Performed by: DERMATOLOGY

## 2021-06-10 PROCEDURE — 86038 ANTINUCLEAR ANTIBODIES: CPT | Performed by: DERMATOLOGY

## 2021-06-10 PROCEDURE — 84443 ASSAY THYROID STIM HORMONE: CPT

## 2021-06-10 NOTE — TELEPHONE ENCOUNTER
----- Message from Adelina Bee MD sent at 6/10/2021  3:09 PM EDT -----  Due for a physical exam to ho HOWE   Call mom to schedule

## 2021-06-10 NOTE — PROGRESS NOTES
Suture removal    Date/Time: 6/10/2021 9:22 AM  Performed by: Yobani Rapp  Authorized by: Varsha Capone MD   Universal Protocol:  Consent: Verbal consent obtained  Written consent not obtained  Consent given by: patient  Timeout called at: 6/10/2021 9:22 AM   Patient understanding: patient states understanding of the procedure being performed  Patient consent: the patient's understanding of the procedure matches consent given  Procedure consent: procedure consent matches procedure scheduled  Relevant documents: relevant documents present and verified  Test results: test results not available  Site marked: the operative site was not marked  Radiology Images displayed and confirmed  If images not available, report reviewed: imaging studies not available  Patient identity confirmed: verbally with patient        Patient location:  Clinic  Location:     Laterality:  Left    Location:  [address] location:  Neck (posterior)  Procedure details: Tools used:  Suture removal kit    Wound appearance:  No sign(s) of infection, good wound healing and clean    Number of sutures removed:  2    Number of staples removed:  0  Post-procedure details:     Post-removal:  Dressing applied    Patient tolerance of procedure:   Tolerated well, no immediate complications

## 2021-06-11 ENCOUNTER — PATIENT OUTREACH (OUTPATIENT)
Dept: FAMILY MEDICINE CLINIC | Facility: CLINIC | Age: 36
End: 2021-06-11

## 2021-06-11 NOTE — LETTER
Tee Villalobos [phone]    Re: counseling resources    6/11/2021       Dear Canelo Jasmine,    I tried to reach you by phone on 5/25, 6/7 and 6/11 and was unfortunately unable to reach you  Should you need any assistance with obtaining help for counseling or drug & alcohol resources, please feel free to contact me       Sincerely,     DELMIS SmallsW

## 2021-06-11 NOTE — PROGRESS NOTES
3rd attempt    SW called pt  Reached voicemail and left message  SW completed and sent Unable to Reach letter to pt  SUZY willl be available if pt contacts me  Shane Manifold

## 2021-06-18 LAB
NIACIN SERPL-MCNC: <5 NG/ML (ref 0–5)
NICOTINAMIDE SERPL-MCNC: 10.5 NG/ML (ref 5.2–72.1)

## 2021-06-19 ENCOUNTER — OFFICE VISIT (OUTPATIENT)
Dept: FAMILY MEDICINE CLINIC | Facility: CLINIC | Age: 36
End: 2021-06-19
Payer: COMMERCIAL

## 2021-06-19 VITALS
TEMPERATURE: 98.1 F | WEIGHT: 235.6 LBS | SYSTOLIC BLOOD PRESSURE: 140 MMHG | RESPIRATION RATE: 16 BRPM | OXYGEN SATURATION: 97 % | HEART RATE: 92 BPM | DIASTOLIC BLOOD PRESSURE: 88 MMHG | BODY MASS INDEX: 33.73 KG/M2 | HEIGHT: 70 IN

## 2021-06-19 DIAGNOSIS — F10.20 ALCOHOL DEPENDENCE, CONTINUOUS (HCC): ICD-10-CM

## 2021-06-19 DIAGNOSIS — Z00.00 PHYSICAL EXAM: Primary | ICD-10-CM

## 2021-06-19 DIAGNOSIS — F33.41 RECURRENT MAJOR DEPRESSIVE DISORDER, IN PARTIAL REMISSION (HCC): ICD-10-CM

## 2021-06-19 DIAGNOSIS — E55.9 VITAMIN D DEFICIENCY: ICD-10-CM

## 2021-06-19 DIAGNOSIS — H60.332 ACUTE SWIMMER'S EAR OF LEFT SIDE: ICD-10-CM

## 2021-06-19 DIAGNOSIS — I10 ESSENTIAL HYPERTENSION: ICD-10-CM

## 2021-06-19 LAB
SL AMB  POCT GLUCOSE, UA: ABNORMAL
SL AMB LEUKOCYTE ESTERASE,UA: 25
SL AMB POCT BILIRUBIN,UA: ABNORMAL
SL AMB POCT BLOOD,UA: ABNORMAL
SL AMB POCT CLARITY,UA: CLEAR
SL AMB POCT COLOR,UA: YELLOW
SL AMB POCT KETONES,UA: 15
SL AMB POCT NITRITE,UA: ABNORMAL
SL AMB POCT PH,UA: 5
SL AMB POCT SPECIFIC GRAVITY,UA: 1.02
SL AMB POCT URINE PROTEIN: ABNORMAL
SL AMB POCT UROBILINOGEN: 1

## 2021-06-19 PROCEDURE — 3008F BODY MASS INDEX DOCD: CPT | Performed by: DERMATOLOGY

## 2021-06-19 PROCEDURE — 99395 PREV VISIT EST AGE 18-39: CPT | Performed by: FAMILY MEDICINE

## 2021-06-19 PROCEDURE — 81002 URINALYSIS NONAUTO W/O SCOPE: CPT | Performed by: FAMILY MEDICINE

## 2021-06-19 RX ORDER — LISINOPRIL 2.5 MG/1
2.5 TABLET ORAL DAILY
Qty: 90 TABLET | Refills: 3 | Status: SHIPPED | OUTPATIENT
Start: 2021-06-19 | End: 2022-06-15

## 2021-06-19 RX ORDER — OFLOXACIN 3 MG/ML
5 SOLUTION AURICULAR (OTIC) 2 TIMES DAILY
Qty: 5 ML | Refills: 0 | Status: SHIPPED | OUTPATIENT
Start: 2021-06-19 | End: 2021-07-01

## 2021-06-21 NOTE — RESULT ENCOUNTER NOTE
Niacin level checked    Nicotinamide low normal   Nicotinic acid level not given (<5  0-5 normal)  worthwhile to supplement B 3 and repeat test   Left voicemail for patient

## 2021-06-22 RX ORDER — GAUZE BANDAGE 2" X 2"
100 BANDAGE TOPICAL DAILY
Qty: 90 TABLET | Refills: 1 | Status: SHIPPED | OUTPATIENT
Start: 2021-06-22 | End: 2022-04-14

## 2021-06-22 RX ORDER — ERGOCALCIFEROL 1.25 MG/1
50000 CAPSULE ORAL WEEKLY
Qty: 12 CAPSULE | Refills: 2 | Status: SHIPPED | OUTPATIENT
Start: 2021-06-22 | End: 2022-04-14

## 2021-06-22 RX ORDER — FOLIC ACID 1 MG/1
1 TABLET ORAL DAILY
Qty: 90 TABLET | Refills: 1 | Status: SHIPPED | OUTPATIENT
Start: 2021-06-22 | End: 2022-04-14 | Stop reason: SDUPTHER

## 2021-06-23 NOTE — PATIENT INSTRUCTIONS

## 2021-06-23 NOTE — PROGRESS NOTES
[facility]    NAME: Nick Sloan  AGE: 38 y o  SEX: male  : 1985     DATE: 2021     Assessment and Plan:     Problem List Items Addressed This Visit        Other    Alcohol dependence, continuous ([facility] )    Recurrent major depressive disorder, in partial remission ([facility] )      Other Visit Diagnoses     Physical exam    -  Primary    Relevant Orders    POCT urine dip (Completed)    Essential hypertension        Relevant Medications    lisinopril (ZESTRIL) 2 5 mg tablet    Acute swimmer's ear of left side        Relevant Medications    ofloxacin (FLOXIN) 0 3 % otic solution    Vitamin D deficiency        Relevant Medications    ergocalciferol (VITAMIN D2) 50,000 units         left otitis externa on exam   Antibiotic treatment given  Vitamin-D deficiency started on 21192 units  Weekly times 12 weeks  Once patient is discontinued we will start him on 5000 units  Patient has a history of alcohol dependence we have given him recommendations for Gen psych  Will discuss with his mother about this at her appointment this week  Depression patient has declined all medications given that they could be processed by the liver and cause acute liver failure  Reviewed all blood work with the patient advised if his risk of cirrhosis even the setting of normal labs    Addendum   Discussed with mom after finding out about vit def discussed with Derm  Adding Folic acid 1g, and thiamine 100 mg daily   Immunizations and preventive care screenings were discussed with patient today  Appropriate education was printed on patient's after visit summary  BMI Counseling: Body mass index is 34 29 kg/m²  The BMI is above normal  Nutrition recommendations include consuming healthier snacks  Exercise recommendations include exercising 3-5 times per week  Tobacco Cessation Counseling: Tobacco cessation counseling was provided         No follow-ups on file  Chief Complaint:     Chief Complaint   Patient presents with    Physical Exam     patient here for annual exam      History of Present Illness:     Adult Annual Physical   Patient here for a comprehensive physical exam   Unfortunately patient is an alcoholic  He has about 15 shots of liquor prior to bedtime  He states that it helps with his insomnia  Patient was doing well in dropped down to 13 however his step dad's car broke down and that gave him anxiety  Patient has not reached out to Psychiatry yet  Patient stating that he has left ear discomfort  Was getting prednisone treatment for his rash and was advised to go to [place] for follow-up  Patient states that the prednisone fully improved his rash but shortly after discontinuing it returned  Patient has a history of significant vitamin-D deficiency  Diet and Physical Activity  · Diet/Nutrition: poor diet  · Exercise: no formal exercise  Depression Screening  PHQ-9 Depression Screening    PHQ-9:   Frequency of the following problems over the past two weeks:      Little interest or pleasure in doing things: 2 - more than half the days  Feeling down, depressed, or hopeless: 2 - more than half the days  Trouble falling or staying asleep, or sleeping too much: 2 - more than half the days  Feeling tired or having little energy: 0 - not at all  Poor appetite or overeating: 3 - nearly every day  Feeling bad about yourself - or that you are a failure or have let yourself or your family down: 0 - not at all  Trouble concentrating on things, such as reading the newspaper or watching television: 0 - not at all  Moving or speaking so slowly that other people could have noticed   Or the opposite - being so fidgety or restless that you have been moving around a lot more than usual: 0 - not at all  Thoughts that you would be better off dead, or of hurting yourself in some way: 0 - not at all  PHQ-2 Score: 4  PHQ-9 Score: 9 General Health  · Sleep: sleeps poorly  · Hearing: normal - bilateral   · Vision: no vision problems  · Dental: no dental visits for >1 year   Health  · History of STDs?:  Currently not sexually active  Has no history of STDs     Review of Systems:     Review of Systems   Constitutional: Negative for activity change, appetite change, chills, fatigue and fever  HENT: Negative for congestion  Respiratory: Negative for cough, chest tightness and shortness of breath  Cardiovascular: Negative for chest pain and leg swelling  Gastrointestinal: Negative for abdominal distention, abdominal pain, constipation, diarrhea, nausea and vomiting  Skin: Positive for rash  Psychiatric/Behavioral: Positive for sleep disturbance  The patient is nervous/anxious  All other systems reviewed and are negative  Past Medical History:     Past Medical History:   Diagnosis Date    Anxiety     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     Onset date: 7/30/2010     Eczema     Lyme disease     Onset date: 2/3/6526     Uncomplicated alcohol abuse     Resolved 9/24/2014       Past Surgical History:     Past Surgical History:   Procedure Laterality Date    APPENDECTOMY      SINUS SURGERY      TONSILLECTOMY        Social History:     Social History     Socioeconomic History    Marital status: Single     Spouse name: None    Number of children: None    Years of education: None    Highest education level: None   Occupational History    None   Tobacco Use    Smoking status: Current Every Day Smoker     Packs/day: 1 00     Types: Cigarettes    Smokeless tobacco: Never Used   Substance and Sexual Activity    Alcohol use:  Yes     Alcohol/week: 7 0 standard drinks     Types: 7 Shots of liquor per week    Drug use: No    Sexual activity: None   Other Topics Concern    None   Social History Narrative    History of cigarette smoker - As per [employer]    Former smoker - As per [employer]    Marijuana - As per Lanie      Social Determinants of Health     Financial Resource Strain:     Difficulty of Paying Living Expenses:    Food Insecurity:     Worried About Running Out of Food in the Last Year:     [address] in the Last Year:    Transportation Needs:     Lack of Transportation (Medical):  Lack of Transportation (Non-Medical):    Physical Activity:     Days of Exercise per Week:     Minutes of Exercise per Session:    Stress:     Feeling of Stress :    Social Connections:     Frequency of Communication with Friends and Family:     Frequency of Social Gatherings with Friends and Family:     Attends Jew Services:     Active Member of Clubs or Organizations:     Attends Club or Organization Meetings:     Marital Status:    Intimate Partner Violence:     Fear of Current or Ex-Partner:     Emotionally Abused:     Physically Abused:     Sexually Abused:       Family History:     History reviewed  No pertinent family history     Current Medications:     Current Outpatient Medications   Medication Sig Dispense Refill    diphenhydrAMINE (BENADRYL) 25 mg tablet Take 25 mg by mouth every 6 (six) hours as needed for itching      betamethasone, augmented, (DIPROLENE-AF) 0 05 % cream Apply topically 2 (two) times a day (Patient not taking: Reported on 5/24/2021) 30 g 0    ergocalciferol (VITAMIN D2) 50,000 units Take 1 capsule (50,000 Units total) by mouth once a week 12 capsule 2    lisinopril (ZESTRIL) 2 5 mg tablet Take 1 tablet (2 5 mg total) by mouth daily 90 tablet 3    methylPREDNISolone 4 MG tablet therapy pack Use as directed on package (Patient not taking: Reported on 6/19/2021) 21 each 0    niacinamide 500 mg tablet Take 1 tablet (500 mg total) by mouth daily Take with protein rich meal 90 tablet 1    ofloxacin (FLOXIN) 0 3 % otic solution Administer 5 drops into the left ear 2 (two) times a day 5 mL 0    predniSONE 20 mg tablet Take 3 tablets (60 mg total) by mouth daily for 7 days, THEN 2 tablets (40 mg total) daily for 7 days, THEN 1 tablet (20 mg total) daily for 7 days  (Patient not taking: Reported on 6/19/2021) 42 tablet 0     No current facility-administered medications for this visit  Allergies:     No Known Allergies   Physical Exam:     /88 (BP Location: Left arm, Patient Position: Sitting, Cuff Size: Standard)   Pulse 92   Temp 98 1 °F (36 7 °C) (Temporal)   Resp 16   Ht 5' 9 5" (1 765 m)   Wt 107 kg (235 lb 9 6 oz)   SpO2 97%   BMI 34 29 kg/m²     Physical Exam  Vitals reviewed  Constitutional:       Appearance: He is well-developed  HENT:      Head: Normocephalic and atraumatic  Right Ear: External ear normal       Left Ear: External ear normal       Nose: Nose normal    Eyes:      Conjunctiva/sclera: Conjunctivae normal       Pupils: Pupils are equal, round, and reactive to light  Cardiovascular:      Rate and Rhythm: Normal rate and regular rhythm  Heart sounds: Normal heart sounds  Pulmonary:      Effort: Pulmonary effort is normal       Breath sounds: Normal breath sounds  No wheezing  Abdominal:      General: Bowel sounds are normal  There is no distension  Palpations: Abdomen is soft  There is no mass  Tenderness: There is no abdominal tenderness  Genitourinary:     Penis: Normal     Musculoskeletal:         General: No tenderness  Normal range of motion  Cervical back: Normal range of motion and neck supple  Skin:     General: Skin is warm  Capillary Refill: Capillary refill takes less than 2 seconds  Findings: Rash ( neck line /bilateral hands dry erythematous plaque rash) present  Neurological:      Mental Status: He is alert and oriented to person, place, and time  Cranial Nerves: No cranial nerve deficit  Sensory: No sensory deficit  Motor: No abnormal muscle tone        Coordination: Coordination normal       Deep Tendon Reflexes: Reflexes normal       Comments: No tremor present today   Psychiatric:         Behavior: Behavior normal          Thought Content:  Thought content normal          Judgment: Judgment normal           Ruth Ann Vo MD   [address]

## 2021-06-28 ENCOUNTER — RA CDI HCC (OUTPATIENT)
Dept: OTHER | Facility: HOSPITAL | Age: 36
End: 2021-06-28

## 2021-06-28 NOTE — PROGRESS NOTES
[facility]  coding opportunities          Chart reviewed, no opportunity found: CHART REVIEWED, NO OPPORTUNITY FOUND                     Patients insurance company: [facility] [address]  (Medicare Advantage and Commercial)

## 2021-07-01 ENCOUNTER — OFFICE VISIT (OUTPATIENT)
Dept: FAMILY MEDICINE CLINIC | Facility: CLINIC | Age: 36
End: 2021-07-01
Payer: COMMERCIAL

## 2021-07-01 VITALS
RESPIRATION RATE: 16 BRPM | WEIGHT: 237.4 LBS | TEMPERATURE: 99.1 F | SYSTOLIC BLOOD PRESSURE: 130 MMHG | DIASTOLIC BLOOD PRESSURE: 82 MMHG | HEIGHT: 70 IN | OXYGEN SATURATION: 97 % | BODY MASS INDEX: 33.99 KG/M2 | HEART RATE: 94 BPM

## 2021-07-01 DIAGNOSIS — H60.332 ACUTE SWIMMER'S EAR OF LEFT SIDE: ICD-10-CM

## 2021-07-01 DIAGNOSIS — F10.10 ALCOHOL ABUSE: ICD-10-CM

## 2021-07-01 DIAGNOSIS — I10 ESSENTIAL HYPERTENSION: Primary | ICD-10-CM

## 2021-07-01 PROCEDURE — 3079F DIAST BP 80-89 MM HG: CPT | Performed by: FAMILY MEDICINE

## 2021-07-01 PROCEDURE — 3008F BODY MASS INDEX DOCD: CPT | Performed by: FAMILY MEDICINE

## 2021-07-01 PROCEDURE — 3075F SYST BP GE 130 - 139MM HG: CPT | Performed by: FAMILY MEDICINE

## 2021-07-01 PROCEDURE — 99213 OFFICE O/P EST LOW 20 MIN: CPT | Performed by: FAMILY MEDICINE

## 2021-07-01 PROCEDURE — 4004F PT TOBACCO SCREEN RCVD TLK: CPT | Performed by: FAMILY MEDICINE

## 2021-07-13 NOTE — PROGRESS NOTES
Kofi Felton 1985 male MRN: [id number]    FAMILY PRACTICE OFFICE VISIT  [facility] - [address]      ASSESSMENT/PLAN  Kofi Felton is a 39 y o  male presents to the office for    1  Essential hypertension   currently controlled  Recommend the patient keep a close monitor at home if anything changes to please contact our office   - Comprehensive metabolic panel; Future    2  Alcohol abuse    Long education given to the patient about the alcohol abuse causing problems with hypertension and cholesterol the future  Patient agrees but continues to decline any rehab  Does not want to go to AA given that it is more spiritual     3  Acute swimmer's ear of left side    Resolved at this time           Future Appointments   Date Time Provider Alvarado Cortez   10/14/2021  3:30 PM Ana Cristina Johnson MD [facility]          SUBJECTIVE  CC: Follow-up (patient here to follow up BP check and earpain which is much better) and Blood Pressure Check      HPI:  Kofi Felton is a 39 y o  male who presents for   A blood pressure check  Patient has been monitoring his blood pressure and states that it has improved  States that he is off the steroids which is likely contributing to it  Patient states his ear pain is also much better  Denies any acute concerns continue to abuse alcohol  Unable to wean off  Declines any help at this time  Review of Systems   Constitutional: Negative for activity change, appetite change, chills, fatigue and fever  HENT: Negative for congestion  Respiratory: Negative for cough, chest tightness and shortness of breath  Cardiovascular: Negative for chest pain and leg swelling  Gastrointestinal: Negative for abdominal distention, abdominal pain, constipation, diarrhea, nausea and vomiting  Psychiatric/Behavioral: Positive for sleep disturbance  The patient is nervous/anxious  All other systems reviewed and are negative        Historical Information   The patient history was reviewed as follows:  Past Medical History:   Diagnosis Date    Anxiety     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     Onset date: 7/30/2010     Eczema     Lyme disease     Onset date: 7/5/5733     Uncomplicated alcohol abuse     Resolved 9/24/2014          Medications:     Current Outpatient Medications:     ergocalciferol (VITAMIN D2) 50,000 units, Take 1 capsule (50,000 Units total) by mouth once a week, Disp: 12 capsule, Rfl: 2    folic acid (FOLVITE) 1 mg tablet, Take 1 tablet (1 mg total) by mouth daily, Disp: 90 tablet, Rfl: 1    lisinopril (ZESTRIL) 2 5 mg tablet, Take 1 tablet (2 5 mg total) by mouth daily, Disp: 90 tablet, Rfl: 3    niacinamide 500 mg tablet, Take 1 tablet (500 mg total) by mouth daily Take with protein rich meal, Disp: 90 tablet, Rfl: 1    Thiamine Mononitrate (VITAMIN B1) 100 mg tablet, Take 1 tablet (100 mg total) by mouth daily, Disp: 90 tablet, Rfl: 1    No Known Allergies    OBJECTIVE  Vitals:   Vitals:    07/01/21 1651   BP: 130/82   BP Location: Left arm   Patient Position: Sitting   Cuff Size: Standard   Pulse: 94   Resp: 16   Temp: 99 1 °F (37 3 °C)   TempSrc: Temporal   SpO2: 97%   Weight: 108 kg (237 lb 6 4 oz)   Height: 5' 9 5" (1 765 m)         Physical Exam  Vitals reviewed  Constitutional:       Appearance: Normal appearance  He is well-developed  HENT:      Head: Normocephalic and atraumatic  Right Ear: Tympanic membrane, ear canal and external ear normal       Left Ear: Tympanic membrane, ear canal and external ear normal       Nose: Nose normal       Mouth/Throat:      Mouth: Mucous membranes are moist    Eyes:      Conjunctiva/sclera: Conjunctivae normal       Pupils: Pupils are equal, round, and reactive to light  Cardiovascular:      Rate and Rhythm: Normal rate and regular rhythm  Heart sounds: Normal heart sounds  Pulmonary:      Effort: Pulmonary effort is normal  No respiratory distress        Breath sounds: Normal breath sounds  Musculoskeletal:         General: Normal range of motion  Cervical back: Normal range of motion and neck supple  Skin:     General: Skin is warm  Capillary Refill: Capillary refill takes less than 2 seconds  Neurological:      General: No focal deficit present  Mental Status: He is alert and oriented to person, place, and time  Psychiatric:         Mood and Affect: Mood normal          Behavior: Behavior normal          Thought Content:  Thought content normal          Judgment: Judgment normal                     Eliazar Villela MD,   [facility]  7/12/2021

## 2021-07-22 ENCOUNTER — TELEPHONE (OUTPATIENT)
Dept: FAMILY MEDICINE CLINIC | Facility: CLINIC | Age: 36
End: 2021-07-22

## 2021-07-22 NOTE — TELEPHONE ENCOUNTER
Dr Librada Krabbe,    Mom called and wanted to know if you could prescribe the prednisone and/or an ointment with some kind of steroid in it because his skin is breaking out all over his face, ears, and hands  Pharmacy is [facility] in [place]  Please advise

## 2021-07-22 NOTE — TELEPHONE ENCOUNTER
Prednisone was called in  HOLD on topical cream at this time   If no improvement advise her to schedule with Derm again who is treating this rash

## 2021-10-14 ENCOUNTER — OFFICE VISIT (OUTPATIENT)
Dept: FAMILY MEDICINE CLINIC | Facility: CLINIC | Age: 36
End: 2021-10-14
Payer: COMMERCIAL

## 2021-10-14 VITALS
OXYGEN SATURATION: 97 % | BODY MASS INDEX: 34.41 KG/M2 | HEART RATE: 84 BPM | TEMPERATURE: 98.8 F | WEIGHT: 240.4 LBS | HEIGHT: 70 IN | SYSTOLIC BLOOD PRESSURE: 120 MMHG | RESPIRATION RATE: 16 BRPM | DIASTOLIC BLOOD PRESSURE: 76 MMHG

## 2021-10-14 DIAGNOSIS — R21 RASH: ICD-10-CM

## 2021-10-14 DIAGNOSIS — F10.10 ALCOHOL ABUSE: ICD-10-CM

## 2021-10-14 DIAGNOSIS — I10 PRIMARY HYPERTENSION: Primary | ICD-10-CM

## 2021-10-14 PROCEDURE — 3008F BODY MASS INDEX DOCD: CPT | Performed by: FAMILY MEDICINE

## 2021-10-14 PROCEDURE — 4004F PT TOBACCO SCREEN RCVD TLK: CPT | Performed by: FAMILY MEDICINE

## 2021-10-14 PROCEDURE — 3074F SYST BP LT 130 MM HG: CPT | Performed by: FAMILY MEDICINE

## 2021-10-14 PROCEDURE — 3078F DIAST BP <80 MM HG: CPT | Performed by: FAMILY MEDICINE

## 2021-10-14 PROCEDURE — 99214 OFFICE O/P EST MOD 30 MIN: CPT | Performed by: FAMILY MEDICINE

## 2022-04-14 ENCOUNTER — OFFICE VISIT (OUTPATIENT)
Dept: FAMILY MEDICINE CLINIC | Facility: CLINIC | Age: 37
End: 2022-04-14
Payer: COMMERCIAL

## 2022-04-14 VITALS
WEIGHT: 242 LBS | DIASTOLIC BLOOD PRESSURE: 72 MMHG | RESPIRATION RATE: 14 BRPM | TEMPERATURE: 98.5 F | HEART RATE: 78 BPM | HEIGHT: 70 IN | SYSTOLIC BLOOD PRESSURE: 120 MMHG | BODY MASS INDEX: 34.65 KG/M2

## 2022-04-14 DIAGNOSIS — G24.5 EYE TWITCH: ICD-10-CM

## 2022-04-14 DIAGNOSIS — F10.20 ALCOHOL DEPENDENCE, CONTINUOUS (HCC): ICD-10-CM

## 2022-04-14 DIAGNOSIS — I10 PRIMARY HYPERTENSION: Primary | ICD-10-CM

## 2022-04-14 DIAGNOSIS — R21 RASH: ICD-10-CM

## 2022-04-14 DIAGNOSIS — F33.9 DEPRESSION, RECURRENT (HCC): ICD-10-CM

## 2022-04-14 PROCEDURE — 99214 OFFICE O/P EST MOD 30 MIN: CPT | Performed by: FAMILY MEDICINE

## 2022-04-14 PROCEDURE — 3008F BODY MASS INDEX DOCD: CPT | Performed by: FAMILY MEDICINE

## 2022-04-14 PROCEDURE — 4004F PT TOBACCO SCREEN RCVD TLK: CPT | Performed by: FAMILY MEDICINE

## 2022-04-14 PROCEDURE — 3074F SYST BP LT 130 MM HG: CPT | Performed by: FAMILY MEDICINE

## 2022-04-14 PROCEDURE — 3078F DIAST BP <80 MM HG: CPT | Performed by: FAMILY MEDICINE

## 2022-04-14 RX ORDER — MELATONIN
2000 DAILY
COMMUNITY

## 2022-04-14 RX ORDER — GAUZE BANDAGE 2" X 2"
100 BANDAGE TOPICAL DAILY
Qty: 90 TABLET | Refills: 1 | Status: SHIPPED | OUTPATIENT
Start: 2022-04-14

## 2022-04-14 RX ORDER — FOLIC ACID 1 MG/1
1 TABLET ORAL DAILY
Qty: 90 TABLET | Refills: 1 | Status: SHIPPED | OUTPATIENT
Start: 2022-04-14

## 2022-04-14 NOTE — PROGRESS NOTES
Yolette Rodriguez 1985 male MRN: [id number]    [facility] PRACTICE OFFICE VISIT  [facility] - [address]      ASSESSMENT/PLAN  Yolette Rodriguez is a 39 y o  male presents to the office for    Diagnoses and all orders for this visit:    Primary hypertension    Alcohol dependence, continuous (HCC)  -     Thiamine Mononitrate (VITAMIN B1) 100 mg tablet; Take 1 tablet (100 mg total) by mouth daily  -     folic acid (FOLVITE) 1 mg tablet; Take 1 tablet (1 mg total) by mouth daily  -     Comprehensive metabolic panel; Future    Depression, recurrent (HCC)    Eye twitch  -     Comprehensive metabolic panel; Future    Other orders  -     cholecalciferol (VITAMIN D3) 1,000 units tablet; Take 2,000 Units by mouth daily    Very sweet person   patient has tried multiple years to stops drinking; but unfortunately has not been successful  He does not want any more help with quitting  He has been compliant with his lisinopril and states that he has not had any side effects  He has been having eye twitching sent for electrolytes  Referred to 2nd opinion for for dermatology  Recommend vitamins to use while being dependent on alcohol  Depression only stable with alcohol usage  Declines seeing therapy in Psychiatry    BMI Counseling: Body mass index is 35 22 kg/m²  The BMI is above normal  Nutrition recommendations include decreasing fast food intake and consuming healthier snacks  Exercise recommendations include exercising 3-5 times per week  Rationale for BMI follow-up plan is due to patient being overweight or obese  Future Appointments   Date Time Provider Alvarado Cortez   4/14/2022  3:45 PM Juan Carlos Balderas MD [address] [place] [address]          SUBJECTIVE  CC: Blood Pressure Check      HPI:  Yolette Rodriguez is a 39 y o  male who presents for a follow-up appointment  Has been very compliant with his blood pressure medication  Has had eye twitching for last 2-3 weeks    Does not know if it is alcohol related continues to have 5 shots of alcohol at 5:00 p m  Every day  Patient states that his depression is only stable when doing it and does not want to see any specialist given that it has not helped him in the past   He has attempted to cut down to 13 and did not do well  The patient continues to have a rash  Did not feel successful with the last hematologist would like a 2nd opinion if possible        Review of Systems   Constitutional: Negative for activity change, appetite change, chills, fatigue and fever  HENT: Negative for congestion  Respiratory: Negative for cough, chest tightness and shortness of breath  Cardiovascular: Negative for chest pain and leg swelling  Gastrointestinal: Negative for abdominal distention, abdominal pain, constipation, diarrhea, nausea and vomiting  Skin: Positive for rash  All other systems reviewed and are negative        Historical Information   The patient history was reviewed as follows:  Past Medical History:   Diagnosis Date    Anxiety     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     Onset date: 7/30/2010     Eczema     Lyme disease     Onset date: 7/2/6707     Uncomplicated alcohol abuse     Resolved 9/24/2014          Medications:     Current Outpatient Medications:     lisinopril (ZESTRIL) 2 5 mg tablet, Take 1 tablet (2 5 mg total) by mouth daily, Disp: 90 tablet, Rfl: 3    ergocalciferol (VITAMIN D2) 50,000 units, Take 1 capsule (50,000 Units total) by mouth once a week (Patient not taking: Reported on 10/14/2021), Disp: 12 capsule, Rfl: 2    folic acid (FOLVITE) 1 mg tablet, Take 1 tablet (1 mg total) by mouth daily (Patient not taking: Reported on 10/14/2021), Disp: 90 tablet, Rfl: 1    No Known Allergies    OBJECTIVE  Vitals:   Vitals:    04/14/22 1534   BP: 120/72   BP Location: Left arm   Patient Position: Sitting   Cuff Size: Adult   Pulse: 78   Resp: 14   Temp: 98 5 °F (36 9 °C)   TempSrc: Temporal   Weight: 110 kg (242 lb)   Height: 5' 9 5" (1 765 m)         Physical Exam  Vitals reviewed  Constitutional:       Appearance: He is well-developed  HENT:      Head: Normocephalic and atraumatic  Eyes:      Conjunctiva/sclera: Conjunctivae normal       Pupils: Pupils are equal, round, and reactive to light  Cardiovascular:      Rate and Rhythm: Normal rate and regular rhythm  Heart sounds: Normal heart sounds  Pulmonary:      Effort: Pulmonary effort is normal  No respiratory distress  Breath sounds: Normal breath sounds  Musculoskeletal:         General: Normal range of motion  Cervical back: Normal range of motion and neck supple  Skin:     General: Skin is warm  Capillary Refill: Capillary refill takes less than 2 seconds  Findings: Rash (Dry skin) present  Neurological:      Mental Status: He is alert and oriented to person, place, and time                      Fercho Hickman MD,   [facility]  4/14/2022

## 2022-08-25 ENCOUNTER — OFFICE VISIT (OUTPATIENT)
Dept: FAMILY MEDICINE CLINIC | Facility: CLINIC | Age: 37
End: 2022-08-25
Payer: COMMERCIAL

## 2022-08-25 VITALS
RESPIRATION RATE: 16 BRPM | HEIGHT: 70 IN | TEMPERATURE: 98.3 F | WEIGHT: 238 LBS | BODY MASS INDEX: 34.07 KG/M2 | OXYGEN SATURATION: 97 % | SYSTOLIC BLOOD PRESSURE: 140 MMHG | DIASTOLIC BLOOD PRESSURE: 80 MMHG | HEART RATE: 92 BPM

## 2022-08-25 DIAGNOSIS — F41.8 DEPRESSION WITH ANXIETY: ICD-10-CM

## 2022-08-25 DIAGNOSIS — R21 RASH: Primary | ICD-10-CM

## 2022-08-25 DIAGNOSIS — I10 ESSENTIAL HYPERTENSION: ICD-10-CM

## 2022-08-25 PROCEDURE — 3725F SCREEN DEPRESSION PERFORMED: CPT | Performed by: FAMILY MEDICINE

## 2022-08-25 PROCEDURE — 99213 OFFICE O/P EST LOW 20 MIN: CPT | Performed by: FAMILY MEDICINE

## 2022-08-25 PROCEDURE — 3079F DIAST BP 80-89 MM HG: CPT | Performed by: FAMILY MEDICINE

## 2022-08-25 PROCEDURE — 3077F SYST BP >= 140 MM HG: CPT | Performed by: FAMILY MEDICINE

## 2022-08-25 RX ORDER — LISINOPRIL 2.5 MG/1
2.5 TABLET ORAL DAILY
Qty: 90 TABLET | Refills: 2 | Status: SHIPPED | OUTPATIENT
Start: 2022-08-25

## 2022-08-25 NOTE — LETTER
Date: 8/25/2022    To whom it may concern: This is to certify that Justus Hunt has been under my care for the following diagnosis:Depression with Anxiety  I feel that Justus Hunt is unable to serve on [address] [place] [address] Duty at this time for the above mentioned medical reasons                    Sincerely,  Ruth Ann Vo MD

## 2022-08-25 NOTE — PROGRESS NOTES
Maxine Silverman 1985 male MRN: [id number]    FAMILY PRACTICE OFFICE VISIT  [facility] - [address]      ASSESSMENT/PLAN  Maxine Silverman is a 44 y o  male presents to the office for    Diagnoses and all orders for this visit:    Rash  -     hydrocortisone (WESTCORT) 0 2 % cream; Apply topically 2 (two) times a day    Depression with anxiety    Essential hypertension  -     lisinopril (ZESTRIL) 2 5 mg tablet; Take 1 tablet (2 5 mg total) by mouth daily     Depression with anxiety:  Stable declines being on medications at this time  Hypertension recommend taking lisinopril 2 5 mg daily  If symptoms worsen recommend increasing the 5 mg  Patient does have an alcohol history  Rash recommend establishing with [place]; given chronic eczema       Tobacco Cessation Counseling: Tobacco cessation counseling was provided  No future appointments  SUBJECTIVE  CC: Follow-up (Skin )      HPI:  Maxine Silverman is a 44 y o  male who presents for acute appointment  Patient would like a refill of his hydrocortisone cream   States that he does not want to do steroids given that does not help him  Has been taking his blood pressure medications  Continues to take 20 shots of alcohol at 5:00 p m  To help him with his sleep  Denies any help with his depression with anxiety  Review of Systems   Constitutional: Negative for activity change, appetite change, chills, fatigue and fever  HENT: Negative for congestion  Respiratory: Negative for cough, chest tightness and shortness of breath  Cardiovascular: Negative for chest pain and leg swelling  Gastrointestinal: Negative for abdominal distention, abdominal pain, constipation, diarrhea, nausea and vomiting  Skin: Positive for rash  All other systems reviewed and are negative        Historical Information   The patient history was reviewed as follows:  Past Medical History:   Diagnosis Date    Anxiety     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     Onset date: 7/30/2010     Eczema     Lyme disease     Onset date: 9/4/3236     Uncomplicated alcohol abuse     Resolved 9/24/2014          Medications:     Current Outpatient Medications:     hydrocortisone (WESTCORT) 0 2 % cream, Apply topically 2 (two) times a day, Disp: 60 g, Rfl: 3    lisinopril (ZESTRIL) 2 5 mg tablet, Take 1 tablet (2 5 mg total) by mouth daily, Disp: 90 tablet, Rfl: 2    cholecalciferol (VITAMIN D3) 1,000 units tablet, Take 2,000 Units by mouth daily (Patient not taking: Reported on 8/25/2022), Disp: , Rfl:     folic acid (FOLVITE) 1 mg tablet, Take 1 tablet (1 mg total) by mouth daily (Patient not taking: Reported on 8/25/2022), Disp: 90 tablet, Rfl: 1    Thiamine Mononitrate (VITAMIN B1) 100 mg tablet, Take 1 tablet (100 mg total) by mouth daily (Patient not taking: Reported on 8/25/2022), Disp: 90 tablet, Rfl: 1    No Known Allergies    OBJECTIVE  Vitals:   Vitals:    08/25/22 1027   BP: 140/80   BP Location: Left arm   Patient Position: Sitting   Cuff Size: Large   Pulse: 92   Resp: 16   Temp: 98 3 °F (36 8 °C)   SpO2: 97%   Weight: 108 kg (238 lb)   Height: 5' 9 5" (1 765 m)         Physical Exam  Vitals reviewed  Constitutional:       Appearance: He is well-developed  HENT:      Head: Normocephalic and atraumatic  Eyes:      Conjunctiva/sclera: Conjunctivae normal       Pupils: Pupils are equal, round, and reactive to light  Cardiovascular:      Rate and Rhythm: Normal rate and regular rhythm  Heart sounds: Normal heart sounds  Pulmonary:      Effort: Pulmonary effort is normal  No respiratory distress  Breath sounds: Normal breath sounds  Musculoskeletal:         General: Normal range of motion  Cervical back: Normal range of motion and neck supple  Skin:     General: Skin is warm  Capillary Refill: Capillary refill takes less than 2 seconds        Comments: Diffuse dry skin with scaly plaque over face arms and neck Neurological:      Mental Status: He is alert and oriented to person, place, and time                      Dhiraj Brooks MD,   [facility]  8/28/2022

## 2022-12-05 ENCOUNTER — TELEMEDICINE (OUTPATIENT)
Dept: FAMILY MEDICINE CLINIC | Facility: CLINIC | Age: 37
End: 2022-12-05

## 2022-12-05 DIAGNOSIS — U07.1 COVID-19: Primary | ICD-10-CM

## 2022-12-05 NOTE — PROGRESS NOTES
COVID-19 Outpatient Progress Note    Assessment/Plan:    Problem List Items Addressed This Visit    None  Visit Diagnoses     COVID-19    -  Primary         Disposition:     Patient's symptoms have improved  Recommend that we continue supportive care with no treatment  Able to return back to work on this Wednesday    I have spent 15 minutes directly with the patient  Encounter provider: Lizz Pham MD     Provider located at: [address] [place] [id number]     Recent Visits  No visits were found meeting these conditions  Showing recent visits within past 7 days and meeting all other requirements  Today's Visits  Date Type Provider Dept   12/05/22 Telemedicine Lizz Pham  [facility] today's visits and meeting all other requirements  Future Appointments  No visits were found meeting these conditions  Showing future appointments within next 150 days and meeting all other requirements     This virtual check-in was done via telephone and he agrees to proceed  Patient agrees to participate in a virtual check in via telephone or video visit instead of presenting to the office to address urgent/immediate medical needs  Patient is aware this is a billable service  He acknowledged consent and understanding of privacy and security of the video platform  The patient has agreed to participate and understands they can discontinue the visit at any time  After connecting through Telephone, the patient was identified by name and date of birth  Denisse Los was informed that this was a telemedicine visit and that the exam was being conducted confidentially over secure lines  My office door was closed  No one else was in the room  Denisse Cortez acknowledged consent and understanding of privacy and security of the telemedicine visit   I informed the patient that I have reviewed his record in Epic and presented the opportunity for him to ask any questions regarding the visit today  The patient agreed to participate  It was my intent to perform this visit via video technology but the patient was not able to do a video connection so the visit was completed via audio telephone only  Verification of patient location:  Patient is located in the following state in which I hold an active license: [place]    Subjective:   Kyler Blackman is a 44 y o  male who has been screened for COVID-19  Symptom change since last report: improving  Patient's symptoms include nasal congestion, rhinorrhea and sore throat (only in AM)  Patient denies fever, chills, fatigue, malaise, anosmia, loss of taste, cough, shortness of breath, chest tightness, abdominal pain, nausea, vomiting, diarrhea, myalgias and headaches  - Date of symptom onset: 12/2/2022  - Date of positive COVID-19 test: 12/2/2022  Type of test: Rapid antigen  COVID-19 vaccination status: Fully vaccinated (primary series)    Scarlet Frye has been staying home and has isolated themselves in his home  He is taking care to not share personal items and is cleaning all surfaces that are touched often, like counters, tabletops, and doorknobs using household cleaning sprays or wipes  He is wearing a mask when he leaves his room  No results found for: Johnsting Dire, SARSCORONAVI, CORONAVIRUSR, SARSCOVAG, [address] [facility]    Review of Systems   Constitutional: Negative for chills, fatigue and fever  HENT: Positive for congestion, rhinorrhea and sore throat (only in AM)  Respiratory: Negative for cough, chest tightness and shortness of breath  Gastrointestinal: Negative for abdominal pain, diarrhea, nausea and vomiting  Musculoskeletal: Negative for myalgias  Neurological: Negative for headaches       Current Outpatient Medications on File Prior to Visit   Medication Sig   • cholecalciferol (VITAMIN D3) 1,000 units tablet Take 2,000 Units by mouth daily (Patient not taking: Reported on 9/35/9662)   • folic acid (FOLVITE) 1 mg tablet Take 1 tablet (1 mg total) by mouth daily (Patient not taking: Reported on 8/25/2022)   • hydrocortisone (WESTCORT) 0 2 % cream Apply topically 2 (two) times a day   • lisinopril (ZESTRIL) 2 5 mg tablet Take 1 tablet (2 5 mg total) by mouth daily   • Thiamine Mononitrate (VITAMIN B1) 100 mg tablet Take 1 tablet (100 mg total) by mouth daily (Patient not taking: Reported on 8/25/2022)       Objective: There were no vitals taken for this visit  Physical Exam  Constitutional:       Appearance: He is well-developed and well-nourished  HENT:      Head: Normocephalic and atraumatic  Eyes:      Extraocular Movements: EOM normal       Conjunctiva/sclera: Conjunctivae normal       Pupils: Pupils are equal, round, and reactive to light  Pulmonary:      Effort: Pulmonary effort is normal    Neurological:      Mental Status: He is alert and oriented to person, place, and time  Psychiatric:         Mood and Affect: Mood and affect normal          Behavior: Behavior normal          Thought Content:  Thought content normal          Judgment: Judgment normal        Jas Lowe MD

## 2022-12-05 NOTE — LETTER
December 5, 2022     Patient: Helder Dowell  YOB: 1985  Date of Visit: 12/5/2022      To Whom it May Concern:    Diana Odellpe is under my professional care  Charlotte Galloblaire was seen in my office on 12/5/2022  Charlotte More may return to work on 12/9/22; Excused 12/4;12/7;12/8  Test positive on 12/4/22    If you have any questions or concerns, please don't hesitate to call           Sincerely,          Walter Melchor MD        CC: No Recipients

## 2023-07-03 ENCOUNTER — OFFICE VISIT (OUTPATIENT)
Dept: FAMILY MEDICINE CLINIC | Facility: CLINIC | Age: 38
End: 2023-07-03
Payer: COMMERCIAL

## 2023-07-03 VITALS
HEIGHT: 70 IN | HEART RATE: 84 BPM | TEMPERATURE: 99.5 F | SYSTOLIC BLOOD PRESSURE: 120 MMHG | DIASTOLIC BLOOD PRESSURE: 80 MMHG | RESPIRATION RATE: 16 BRPM | WEIGHT: 236 LBS | OXYGEN SATURATION: 97 % | BODY MASS INDEX: 33.79 KG/M2

## 2023-07-03 DIAGNOSIS — F41.8 DEPRESSION WITH ANXIETY: ICD-10-CM

## 2023-07-03 DIAGNOSIS — F10.20 ALCOHOL DEPENDENCE, CONTINUOUS (HCC): ICD-10-CM

## 2023-07-03 DIAGNOSIS — Z13.220 SCREENING CHOLESTEROL LEVEL: ICD-10-CM

## 2023-07-03 DIAGNOSIS — R73.09 ABNORMAL GLUCOSE: Primary | ICD-10-CM

## 2023-07-03 DIAGNOSIS — I10 ESSENTIAL HYPERTENSION: ICD-10-CM

## 2023-07-03 DIAGNOSIS — F10.982 ALCOHOL-INDUCED INSOMNIA (HCC): ICD-10-CM

## 2023-07-03 DIAGNOSIS — Z13.29 SCREENING FOR THYROID DISORDER: ICD-10-CM

## 2023-07-03 PROCEDURE — 99214 OFFICE O/P EST MOD 30 MIN: CPT | Performed by: FAMILY MEDICINE

## 2023-07-03 RX ORDER — FOLIC ACID 1 MG/1
1 TABLET ORAL DAILY
Qty: 90 TABLET | Refills: 1 | Status: SHIPPED | OUTPATIENT
Start: 2023-07-03

## 2023-07-03 RX ORDER — GAUZE BANDAGE 2" X 2"
100 BANDAGE TOPICAL DAILY
Qty: 90 TABLET | Refills: 1 | Status: SHIPPED | OUTPATIENT
Start: 2023-07-03

## 2023-07-03 NOTE — PROGRESS NOTES
Yen Koch 1985 male MRN: [id number]    [facility] PRACTICE OFFICE VISIT  [facility] - [address] [place] [address] [place]      ASSESSMENT/PLAN  Yen Koch is a 43 y.o. male presents to the office for    Diagnoses and all orders for this visit:    Abnormal glucose  -     Hemoglobin A1C; Future    Alcohol dependence, continuous (HCC)  -     Thiamine Mononitrate (VITAMIN B1) 100 mg tablet; Take 1 tablet (100 mg total) by mouth daily  -     folic acid (FOLVITE) 1 mg tablet; Take 1 tablet (1 mg total) by mouth daily    Screening for thyroid disorder  -     TSH, 3rd generation with Free T4 reflex; Future    Essential hypertension  -     Comprehensive metabolic panel; Future  -     CBC and differential; Future    Depression with anxiety    Alcohol-induced insomnia (HCC)    Screening cholesterol level  -     Lipid panel; Future    Hypertension currently stable continue medications as prescribed. Highly recommend discontinue alcohol misuse. All screening labs placed. Depression being stable with alcohol. Patient does not want to take anything given he fears his liver will not tolerate medication. Recommend rehab if he is unable to stop alcohol        eBMI Counseling: Body mass index is 34.35 kg/m². The BMI is above normal. Exercise recommendations include exercising 3-5 times per week. Rationale for BMI follow-up plan is due to patient being overweight or obese. Tobacco Cessation Counseling: Tobacco cessation counseling was provided. No future appointments. SUBJECTIVE  CC: Blood Pressure Check      HPI:  Yen Koch is a 43 y.o. male who presents for a follow-up appointment. Patient unfortunately continues to be taking shots at 5 PM daily. He has attempted to wean as much as possible. But continues to do 15. This is the only thing helping his sleep disturbance as well as his depression and anxiety.  patient states that he does not want to do rehab given that he feels like he can do it himself. Patient is taking his medications as prescribed without any difficulties. Review of Systems   Constitutional: Negative for activity change, appetite change, chills, fatigue and fever. HENT: Negative for congestion. Respiratory: Negative for cough, chest tightness and shortness of breath. Cardiovascular: Negative for chest pain and leg swelling. Gastrointestinal: Negative for abdominal distention, abdominal pain, constipation, diarrhea, nausea and vomiting. Skin: Positive for rash. Psychiatric/Behavioral: Positive for sleep disturbance. The patient is nervous/anxious. All other systems reviewed and are negative. Historical Information   The patient history was reviewed as follows:  Past Medical History:   Diagnosis Date   • Anxiety    • 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     Onset date: 7/30/2010    • Eczema    • Lyme disease     Onset date: 2/0/5393    • Uncomplicated alcohol abuse     Resolved 9/24/2014          Medications:     Current Outpatient Medications:   •  folic acid (FOLVITE) 1 mg tablet, Take 1 tablet (1 mg total) by mouth daily, Disp: 90 tablet, Rfl: 1  •  hydrocortisone (WESTCORT) 0.2 % cream, Apply topically 2 (two) times a day, Disp: 60 g, Rfl: 3  •  lisinopril (ZESTRIL) 2.5 mg tablet, take 1 tablet by mouth once daily, Disp: 90 tablet, Rfl: 0  •  Thiamine Mononitrate (VITAMIN B1) 100 mg tablet, Take 1 tablet (100 mg total) by mouth daily, Disp: 90 tablet, Rfl: 1    No Known Allergies    OBJECTIVE  Vitals:   Vitals:    07/03/23 1444   BP: 120/80   BP Location: Left arm   Patient Position: Sitting   Cuff Size: Large   Pulse: 84   Resp: 16   Temp: 99.5 °F (37.5 °C)   SpO2: 97%   Weight: 107 kg (236 lb)   Height: 5' 9.5" (1.765 m)         Physical Exam  Vitals reviewed. Constitutional:       Appearance: He is well-developed. HENT:      Head: Normocephalic and atraumatic.    Eyes:      Conjunctiva/sclera: Conjunctivae normal.      Pupils: Pupils are equal, round, and reactive to light. Cardiovascular:      Rate and Rhythm: Normal rate and regular rhythm. Heart sounds: Normal heart sounds. Pulmonary:      Effort: Pulmonary effort is normal. No respiratory distress. Breath sounds: Normal breath sounds. Musculoskeletal:         General: Normal range of motion. Cervical back: Normal range of motion and neck supple. Skin:     General: Skin is warm. Capillary Refill: Capillary refill takes less than 2 seconds. Findings: Rash (ov over bilateral hands dry scaly erythemic improvement from the previous time we saw each other) present. Neurological:      Mental Status: He is alert and oriented to person, place, and time.                     Tramaine Tobin MD,   [facility]  7/9/2023

## 2023-07-07 ENCOUNTER — APPOINTMENT (OUTPATIENT)
Dept: LAB | Facility: CLINIC | Age: 38
End: 2023-07-07
Payer: COMMERCIAL

## 2023-07-07 DIAGNOSIS — R73.09 ABNORMAL GLUCOSE: ICD-10-CM

## 2023-07-07 DIAGNOSIS — I10 ESSENTIAL HYPERTENSION: ICD-10-CM

## 2023-07-07 DIAGNOSIS — Z13.29 SCREENING FOR THYROID DISORDER: ICD-10-CM

## 2023-07-07 DIAGNOSIS — Z13.220 SCREENING CHOLESTEROL LEVEL: ICD-10-CM

## 2023-07-07 LAB
ALBUMIN SERPL BCP-MCNC: 4.4 G/DL (ref 3.5–5)
ALP SERPL-CCNC: 76 U/L (ref 46–116)
ALT SERPL W P-5'-P-CCNC: 74 U/L (ref 12–78)
ANION GAP SERPL CALCULATED.3IONS-SCNC: 6 MMOL/L
AST SERPL W P-5'-P-CCNC: 43 U/L (ref 5–45)
BASOPHILS # BLD AUTO: 0.02 THOUSANDS/ÂΜL (ref 0–0.1)
BASOPHILS NFR BLD AUTO: 0 % (ref 0–1)
BILIRUB SERPL-MCNC: 0.77 MG/DL (ref 0.2–1)
BUN SERPL-MCNC: 9 MG/DL (ref 5–25)
CALCIUM SERPL-MCNC: 10.3 MG/DL (ref 8.3–10.1)
CHLORIDE SERPL-SCNC: 107 MMOL/L (ref 96–108)
CHOLEST SERPL-MCNC: 217 MG/DL
CO2 SERPL-SCNC: 26 MMOL/L (ref 21–32)
CREAT SERPL-MCNC: 0.82 MG/DL (ref 0.6–1.3)
EOSINOPHIL # BLD AUTO: 0.14 THOUSAND/ÂΜL (ref 0–0.61)
EOSINOPHIL NFR BLD AUTO: 2 % (ref 0–6)
ERYTHROCYTE [DISTWIDTH] IN BLOOD BY AUTOMATED COUNT: 12.8 % (ref 11.6–15.1)
GFR SERPL CREATININE-BSD FRML MDRD: 111 ML/MIN/1.73SQ M
GLUCOSE P FAST SERPL-MCNC: 116 MG/DL (ref 65–99)
HCT VFR BLD AUTO: 47.2 % (ref 36.5–49.3)
HDLC SERPL-MCNC: 66 MG/DL
HGB BLD-MCNC: 16.1 G/DL (ref 12–17)
IMM GRANULOCYTES # BLD AUTO: 0.05 THOUSAND/UL (ref 0–0.2)
IMM GRANULOCYTES NFR BLD AUTO: 1 % (ref 0–2)
LDLC SERPL CALC-MCNC: 120 MG/DL (ref 0–100)
LYMPHOCYTES # BLD AUTO: 2.15 THOUSANDS/ÂΜL (ref 0.6–4.47)
LYMPHOCYTES NFR BLD AUTO: 30 % (ref 14–44)
MCH RBC QN AUTO: 32.5 PG (ref 26.8–34.3)
MCHC RBC AUTO-ENTMCNC: 34.1 G/DL (ref 31.4–37.4)
MCV RBC AUTO: 95 FL (ref 82–98)
MONOCYTES # BLD AUTO: 0.61 THOUSAND/ÂΜL (ref 0.17–1.22)
MONOCYTES NFR BLD AUTO: 9 % (ref 4–12)
NEUTROPHILS # BLD AUTO: 4.24 THOUSANDS/ÂΜL (ref 1.85–7.62)
NEUTS SEG NFR BLD AUTO: 58 % (ref 43–75)
NONHDLC SERPL-MCNC: 151 MG/DL
NRBC BLD AUTO-RTO: 0 /100 WBCS
PLATELET # BLD AUTO: 241 THOUSANDS/UL (ref 149–390)
PMV BLD AUTO: 11.9 FL (ref 8.9–12.7)
POTASSIUM SERPL-SCNC: 4.5 MMOL/L (ref 3.5–5.3)
PROT SERPL-MCNC: 8.1 G/DL (ref 6.4–8.4)
RBC # BLD AUTO: 4.95 MILLION/UL (ref 3.88–5.62)
SODIUM SERPL-SCNC: 139 MMOL/L (ref 135–147)
TRIGL SERPL-MCNC: 157 MG/DL
TSH SERPL DL<=0.05 MIU/L-ACNC: 2.49 UIU/ML (ref 0.45–4.5)
WBC # BLD AUTO: 7.21 THOUSAND/UL (ref 4.31–10.16)

## 2023-07-07 PROCEDURE — 83036 HEMOGLOBIN GLYCOSYLATED A1C: CPT

## 2023-07-07 PROCEDURE — 80061 LIPID PANEL: CPT

## 2023-07-07 PROCEDURE — 80053 COMPREHEN METABOLIC PANEL: CPT

## 2023-07-07 PROCEDURE — 84443 ASSAY THYROID STIM HORMONE: CPT

## 2023-07-07 PROCEDURE — 36415 COLL VENOUS BLD VENIPUNCTURE: CPT

## 2023-07-07 PROCEDURE — 85025 COMPLETE CBC W/AUTO DIFF WBC: CPT

## 2023-07-10 LAB
EST. AVERAGE GLUCOSE BLD GHB EST-MCNC: 105 MG/DL
HBA1C MFR BLD: 5.3 %

## 2023-07-19 ENCOUNTER — TELEPHONE (OUTPATIENT)
Dept: FAMILY MEDICINE CLINIC | Facility: CLINIC | Age: 38
End: 2023-07-19

## 2023-07-20 NOTE — TELEPHONE ENCOUNTER
Pts mother is aware and expressed full understanding. Pts mother stated that he is trying to stop the alcohol and he is exercising more.

## 2023-07-20 NOTE — TELEPHONE ENCOUNTER
Please call mother back given that the patient does give verbal okay for us to talk with her. Advised her that his overall A1c was normal but his blood sugar is high as well as his cholesterol is stable compared to last year but unfortunately the drinking is likely doing more harm than good and I continue to recommend rehab.   But patient stated that he was not ready at her last appointment

## 2023-09-06 DIAGNOSIS — I10 ESSENTIAL HYPERTENSION: ICD-10-CM

## 2023-09-06 RX ORDER — LISINOPRIL 2.5 MG/1
TABLET ORAL
Qty: 90 TABLET | Refills: 0 | Status: SHIPPED | OUTPATIENT
Start: 2023-09-06

## 2023-10-06 ENCOUNTER — OFFICE VISIT (OUTPATIENT)
Dept: URGENT CARE | Facility: CLINIC | Age: 38
End: 2023-10-06
Payer: COMMERCIAL

## 2023-10-06 VITALS
RESPIRATION RATE: 18 BRPM | BODY MASS INDEX: 33.91 KG/M2 | HEART RATE: 90 BPM | DIASTOLIC BLOOD PRESSURE: 88 MMHG | TEMPERATURE: 98 F | WEIGHT: 233 LBS | SYSTOLIC BLOOD PRESSURE: 132 MMHG | OXYGEN SATURATION: 99 %

## 2023-10-06 DIAGNOSIS — H69.93 DYSFUNCTION OF BOTH EUSTACHIAN TUBES: ICD-10-CM

## 2023-10-06 DIAGNOSIS — J06.9 ACUTE URI: Primary | ICD-10-CM

## 2023-10-06 PROCEDURE — 99213 OFFICE O/P EST LOW 20 MIN: CPT | Performed by: PHYSICIAN ASSISTANT

## 2023-10-06 NOTE — PATIENT INSTRUCTIONS
1. Over-the-counter ibuprofen and/or acetaminophen as needed for pain or fever. 2. Oxymetazoline nasal spray 2 sprays in each nostril every 12 hours for no more than the next 5 days as needed for nasal congestion. 3. Over-the-counter guaifenesin as needed for mucus relief. 4. Gargle salt water as needed for sore throat relief. 5. Increase oral fluid consumption. 6.  Perform the eustachian tube exercises as demonstrated frequently but gently throughout the course of the day. 7. Follow-up with primary care provider in 7 days for any persistent symptoms.

## 2023-10-06 NOTE — LETTER
October 6, 2023     Patient: Bernie Brower   YOB: 1985   Date of Visit: 10/6/2023       To Whom It May Concern: It is my medical opinion that Naz Romero should be excused for his absence from work through 10/6/2023. If you have any questions or concerns, please don't hesitate to call.          Sincerely,        Susan Frye PA-C    CC: No Recipients

## 2023-10-06 NOTE — PROGRESS NOTES
[facility]        NAME: Elouise Brunner is a 43 y.o. male  : 1985    MRN: [id number]  DATE: 2023  TIME: 12:47 PM    Assessment and Plan   Acute URI [J06.9]  1. Acute URI        2. Dysfunction of both eustachian tubes          Patient refused steroids. Patient Instructions     1. Over-the-counter ibuprofen and/or acetaminophen as needed for pain or fever. 2. Oxymetazoline nasal spray 2 sprays in each nostril every 12 hours for no more than the next 5 days as needed for nasal congestion. 3. Over-the-counter guaifenesin as needed for mucus relief. 4. Gargle salt water as needed for sore throat relief. 5. Increase oral fluid consumption. 6.  Perform the eustachian tube exercises as demonstrated frequently but gently throughout the course of the day. 7. Follow-up with primary care provider in 7 days for any persistent symptoms. Chief Complaint     Chief Complaint   Patient presents with   • Nasal Congestion     Runny nose and congestion with sinus pressure since yesterday/ negative covid         History of Present Illness       58-year-old male patient with a 1 day history of significant nasal congestion, runny nose, sinus pressure, headache, cough, bilateral ear pressure and pain. Patient states he had low-grade fever yesterday which resolved. Some mild body aches. Fatigue. No shortness of breath or chest pain. No GI symptoms. Patient did a home COVID test which was negative. Review of Systems   Review of Systems   Constitutional: Positive for fatigue. Negative for chills and fever. HENT: Positive for congestion, ear pain, rhinorrhea, sinus pressure and sore throat. Negative for facial swelling and sinus pain. Respiratory: Positive for cough. Cardiovascular: Negative for chest pain. Gastrointestinal: Negative for abdominal pain. Musculoskeletal: Positive for myalgias. Skin: Negative for rash. Neurological: Positive for headaches.          Current Medications       Current Outpatient Medications:   •  lisinopril (ZESTRIL) 2.5 mg tablet, take 1 tablet by mouth once daily, Disp: 90 tablet, Rfl: 0  •  folic acid (FOLVITE) 1 mg tablet, Take 1 tablet (1 mg total) by mouth daily, Disp: 90 tablet, Rfl: 1  •  hydrocortisone (WESTCORT) 0.2 % cream, Apply topically 2 (two) times a day, Disp: 60 g, Rfl: 3  •  Thiamine Mononitrate (VITAMIN B1) 100 mg tablet, Take 1 tablet (100 mg total) by mouth daily, Disp: 90 tablet, Rfl: 1    Current Allergies     Allergies as of 10/06/2023   • (No Known Allergies)            The following portions of the patient's history were reviewed and updated as appropriate: allergies, current medications, past family history, past medical history, past social history, past surgical history and problem list.     Past Medical History:   Diagnosis Date   • Anxiety    • 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     Onset date: 7/30/2010    • Eczema    • Lyme disease     Onset date: 3/6/0929    • Uncomplicated alcohol abuse     Resolved 9/24/2014        Past Surgical History:   Procedure Laterality Date   • APPENDECTOMY     • SINUS SURGERY     • TONSILLECTOMY         History reviewed. No pertinent family history. Medications have been verified. Objective   /88   Pulse 90   Temp 98 °F (36.7 °C)   Resp 18   Wt 106 kg (233 lb)   SpO2 99%   BMI 33.91 kg/m²        Physical Exam     Physical Exam  Vitals and nursing note reviewed. Constitutional:       General: He is not in acute distress. Appearance: Normal appearance. He is ill-appearing. HENT:      Head: Normocephalic. Ears:      Comments: Bilateral eardrum retraction left greater than right. Both are injected. No middle ear effusions or perforation seen. Nose: Congestion and rhinorrhea present. Comments: Sinuses nontender on exam.     Mouth/Throat:      Mouth: Mucous membranes are moist.      Pharynx: Oropharynx is clear.    Eyes:      Conjunctiva/sclera: Conjunctivae normal.      Pupils: Pupils are equal, round, and reactive to light. Cardiovascular:      Rate and Rhythm: Normal rate and regular rhythm. Pulses: Normal pulses. Pulmonary:      Effort: Pulmonary effort is normal.      Breath sounds: Normal breath sounds. Abdominal:      Tenderness: There is no abdominal tenderness. Musculoskeletal:         General: Normal range of motion. Cervical back: Normal range of motion and neck supple. Skin:     General: Skin is warm and dry. Capillary Refill: Capillary refill takes less than 2 seconds. Neurological:      Mental Status: He is alert and oriented to person, place, and time.    Psychiatric:         Mood and Affect: Mood normal.         Behavior: Behavior normal.

## 2023-10-27 DIAGNOSIS — R21 RASH: ICD-10-CM

## 2023-10-27 RX ORDER — HYDROCORTISONE VALERATE CREAM 2 MG/G
CREAM TOPICAL 2 TIMES DAILY
Qty: 60 G | Refills: 3 | Status: SHIPPED | OUTPATIENT
Start: 2023-10-27

## 2023-12-05 DIAGNOSIS — I10 ESSENTIAL HYPERTENSION: ICD-10-CM

## 2023-12-05 RX ORDER — LISINOPRIL 2.5 MG/1
TABLET ORAL
Qty: 90 TABLET | Refills: 1 | Status: SHIPPED | OUTPATIENT
Start: 2023-12-05

## 2024-06-04 DIAGNOSIS — I10 ESSENTIAL HYPERTENSION: ICD-10-CM

## 2024-06-05 RX ORDER — LISINOPRIL 2.5 MG/1
TABLET ORAL
Qty: 90 TABLET | Refills: 1 | Status: SHIPPED | OUTPATIENT
Start: 2024-06-05

## 2024-06-08 ENCOUNTER — APPOINTMENT (OUTPATIENT)
Dept: RADIOLOGY | Facility: CLINIC | Age: 39
End: 2024-06-08
Payer: COMMERCIAL

## 2024-06-08 ENCOUNTER — OFFICE VISIT (OUTPATIENT)
Dept: URGENT CARE | Facility: CLINIC | Age: 39
End: 2024-06-08
Payer: COMMERCIAL

## 2024-06-08 VITALS
RESPIRATION RATE: 14 BRPM | WEIGHT: 226 LBS | BODY MASS INDEX: 32.9 KG/M2 | HEART RATE: 97 BPM | TEMPERATURE: 99 F | OXYGEN SATURATION: 97 %

## 2024-06-08 DIAGNOSIS — S49.92XA SHOULDER INJURY, LEFT, INITIAL ENCOUNTER: Primary | ICD-10-CM

## 2024-06-08 DIAGNOSIS — S49.92XA SHOULDER INJURY, LEFT, INITIAL ENCOUNTER: ICD-10-CM

## 2024-06-08 PROCEDURE — 73030 X-RAY EXAM OF SHOULDER: CPT

## 2024-06-08 PROCEDURE — 99213 OFFICE O/P EST LOW 20 MIN: CPT | Performed by: PHYSICIAN ASSISTANT

## 2024-06-08 RX ORDER — CYCLOBENZAPRINE HCL 10 MG
10 TABLET ORAL 3 TIMES DAILY PRN
Qty: 12 TABLET | Refills: 0 | Status: SHIPPED | OUTPATIENT
Start: 2024-06-08

## 2024-06-08 RX ORDER — LIDOCAINE 50 MG/G
1 PATCH TOPICAL DAILY
Qty: 30 PATCH | Refills: 0 | Status: SHIPPED | OUTPATIENT
Start: 2024-06-08

## 2024-06-08 NOTE — PROGRESS NOTES
[facility]        NAME: Obed Bae is a 38 y.o. male  : 1985    MRN: [id number]  DATE: 2024  TIME: 12:46 PM    Assessment and Plan   Shoulder injury, left, initial encounter [S49.92XA]  1. Shoulder injury, left, initial encounter  XR shoulder 2+ vw left    Ambulatory referral to Orthopedic Surgery    lidocaine (Lidoderm) 5 %    cyclobenzaprine (FLEXERIL) 10 mg tablet        Will call the patient with the official radiology report.  Recommending follow-up with Ortho this week given the pain and lack of range of motion.    Patient Instructions     Patient Instructions   Shoulder Pain   WHAT YOU NEED TO KNOW:   Shoulder pain is a common problem that can affect your daily activities. Pain can be caused by a problem within your shoulder, such as soreness of a tendon or bursa. A tendon is a cord of tough tissue that connects your muscles to your bones. The bursa is a fluid-filled sac that acts as a cushion between a bone and a tendon. Shoulder pain may also be caused by pain that spreads to your shoulder from another part of your body.       DISCHARGE INSTRUCTIONS:   Return to the emergency department if:   You have severe pain.    You cannot move your arm or shoulder.    You have numbness or tingling in your shoulder or arm.    Contact your healthcare provider if:   Your pain gets worse or does not go away with treatment.    You have trouble moving your arm or shoulder.    You have questions or concerns about your condition or care.    Medicines:  You may need any of the following:  Acetaminophen  decreases pain and fever. It is available without a doctor's order. Ask how much to take and how often to take it. Follow directions. Read the labels of all other medicines you are using to see if they also contain acetaminophen, or ask your doctor or pharmacist. Acetaminophen can cause liver damage if not taken correctly.    NSAIDs , such as ibuprofen, help decrease swelling, pain, and fever.  This medicine is available with or without a doctor's order. NSAIDs can cause stomach bleeding or kidney problems in certain people. If you take blood thinner medicine, always ask your healthcare provider if NSAIDs are safe for you. Always read the medicine label and follow directions.    Take your medicine as directed.  Contact your healthcare provider if you think your medicine is not helping or if you have side effects. Tell your provider if you are allergic to any medicine. Keep a list of the medicines, vitamins, and herbs you take. Include the amounts, and when and why you take them. Bring the list or the pill bottles to follow-up visits. Carry your medicine list with you in case of an emergency.    Manage your symptoms:   Apply ice  on your shoulder for 20 to 30 minutes every 2 hours or as directed. Use an ice pack, or put crushed ice in a plastic bag. Cover it with a towel before you apply it to your shoulder. Ice helps prevent tissue damage and decreases swelling and pain.    Apply heat if ice does not help your symptoms.  Apply heat on your shoulder for 20 to 30 minutes every 2 hours for as many days as directed. Heat helps decrease pain and muscle spasms.    Limit activities as directed.  Try to avoid repeated overhead movements.    Go to physical or occupational therapy as directed.  A physical therapist teaches you exercises to help improve movement and strength, and to decrease pain. An occupational therapist teaches you skills to help with your daily activities.    Prevent shoulder pain:   Maintain a good range of motion in your shoulder.  Ask your healthcare provider which exercises you should do on a regular basis after you have healed.     Stretch and strengthen your shoulder.  Use proper technique during exercises and sports.    Follow up with your healthcare provider or orthopedist as directed:  Write down your questions so you remember to ask them during your visits.   © Copyright [employer] 2023  Information is for End User's use only and may not be sold, redistributed or otherwise used for commercial purposes.  The above information is an  only. It is not intended as medical advice for individual conditions or treatments. Talk to your doctor, nurse or pharmacist before following any medical regimen to see if it is safe and effective for you.        Follow up with PCP in 3-5 days.  Proceed to  ER if symptoms worsen.    Chief Complaint     Chief Complaint   Patient presents with    Shoulder Injury     Pt presents with injury of the left shoulder r/t falling up stairs yesterday landing on left shoulder          History of Present Illness       The patient is a 38-year-old male presenting today for left shoulder pain.  Reports yesterday falling up the stairs and landing on left shoulder.  He had slipped on mail and landed on his shoulder. 10/10 pain. Reports diminished range of motion and pain today. Was not able to work today but does not need a note for today since it was going to be over time.        Review of Systems   Review of Systems   Musculoskeletal:  Positive for arthralgias. Negative for joint swelling.   Skin:  Negative for color change.         Current Medications       Current Outpatient Medications:     cyclobenzaprine (FLEXERIL) 10 mg tablet, Take 1 tablet (10 mg total) by mouth 3 (three) times a day as needed for muscle spasms, Disp: 12 tablet, Rfl: 0    hydrocortisone (WESTCORT) 0.2 % cream, Apply topically 2 (two) times a day, Disp: 60 g, Rfl: 3    lidocaine (Lidoderm) 5 %, Apply 1 patch topically over 12 hours daily Remove & Discard patch within 12 hours or as directed by MD, Disp: 30 patch, Rfl: 0    lisinopril (ZESTRIL) 2.5 mg tablet, take 1 tablet by mouth once daily, Disp: 90 tablet, Rfl: 1    folic acid (FOLVITE) 1 mg tablet, Take 1 tablet (1 mg total) by mouth daily (Patient not taking: Reported on 6/8/2024), Disp: 90 tablet, Rfl: 1    Thiamine Mononitrate (VITAMIN B1)  100 mg tablet, Take 1 tablet (100 mg total) by mouth daily (Patient not taking: Reported on 6/8/2024), Disp: 90 tablet, Rfl: 1    Current Allergies     Allergies as of 06/08/2024    (No Known Allergies)            The following portions of the patient's history were reviewed and updated as appropriate: allergies, current medications, past family history, past medical history, past social history, past surgical history and problem list.     Past Medical History:   Diagnosis Date    Anxiety     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     Onset date: 7/30/2010     Eczema     Lyme disease     Onset date: 7/5/2011     Uncomplicated alcohol abuse     Resolved 9/24/2014        Past Surgical History:   Procedure Laterality Date    APPENDECTOMY      SINUS SURGERY      TONSILLECTOMY         History reviewed. No pertinent family history.      Medications have been verified.        Objective   Pulse 97   Temp 99 °F (37.2 °C)   Resp 14   Wt 103 kg (226 lb)   SpO2 97%   BMI 32.90 kg/m²        Physical Exam     Physical Exam  Vitals and nursing note reviewed.   Constitutional:       General: He is not in acute distress.     Appearance: Normal appearance. He is normal weight. He is not ill-appearing, toxic-appearing or diaphoretic.   HENT:      Head: Normocephalic and atraumatic.   Cardiovascular:      Rate and Rhythm: Normal rate and regular rhythm.      Heart sounds: Normal heart sounds. No murmur heard.     No friction rub. No gallop.   Pulmonary:      Effort: Pulmonary effort is normal. No respiratory distress.      Breath sounds: Normal breath sounds. No stridor. No wheezing, rhonchi or rales.   Chest:      Chest wall: No tenderness.   Abdominal:      General: Abdomen is flat. Bowel sounds are normal.      Palpations: Abdomen is soft.   Musculoskeletal:         General: Normal range of motion.      Cervical back: Normal range of motion.      Comments: Range of motion limited due to pain.  Pain to palpation over the left AC joint.   No pain over the proximal clavicle or scapula.  No erythema or ecchymosis.   Lymphadenopathy:      Cervical: No cervical adenopathy.   Skin:     General: Skin is warm and dry.      Capillary Refill: Capillary refill takes less than 2 seconds.   Neurological:      Mental Status: He is alert.

## 2024-06-08 NOTE — LETTER
June 8, 2024     Patient: Obed Bae  YOB: 1985  Date of Visit: 6/8/2024      To Whom it May Concern:    Obed Bae is under my professional care. Obed was seen in my office on 6/8/2024. Obed may not use his Left arm until cleared by ortho.     If you have any questions or concerns, please don't hesitate to call.         Sincerely,          Terri Ibanez PA-C        CC: No Recipients

## 2024-06-13 ENCOUNTER — OFFICE VISIT (OUTPATIENT)
Dept: FAMILY MEDICINE CLINIC | Facility: CLINIC | Age: 39
End: 2024-06-13
Payer: COMMERCIAL

## 2024-06-13 VITALS
RESPIRATION RATE: 18 BRPM | TEMPERATURE: 98.8 F | BODY MASS INDEX: 32.78 KG/M2 | OXYGEN SATURATION: 96 % | WEIGHT: 229 LBS | HEART RATE: 86 BPM | DIASTOLIC BLOOD PRESSURE: 90 MMHG | SYSTOLIC BLOOD PRESSURE: 136 MMHG | HEIGHT: 70 IN

## 2024-06-13 DIAGNOSIS — M10.9 GOUT OF LEFT FOOT, UNSPECIFIED CAUSE, UNSPECIFIED CHRONICITY: Primary | ICD-10-CM

## 2024-06-13 DIAGNOSIS — G47.00 INSOMNIA, UNSPECIFIED TYPE: ICD-10-CM

## 2024-06-13 PROCEDURE — 99214 OFFICE O/P EST MOD 30 MIN: CPT | Performed by: FAMILY MEDICINE

## 2024-06-13 RX ORDER — TRAZODONE HYDROCHLORIDE 50 MG/1
50 TABLET ORAL
Qty: 30 TABLET | Refills: 0 | Status: SHIPPED | OUTPATIENT
Start: 2024-06-13

## 2024-06-13 RX ORDER — COLCHICINE 0.6 MG/1
TABLET ORAL
Qty: 7 TABLET | Refills: 0 | Status: SHIPPED | OUTPATIENT
Start: 2024-06-13 | End: 2024-06-20 | Stop reason: SDUPTHER

## 2024-06-13 NOTE — PROGRESS NOTES
Obed Bae 1985 male MRN: [id number]    FAMILY PRACTICE OFFICE VISIT  [facility]      ASSESSMENT/PLAN  Obed Bae is a 38 y.o. male presents to the office for    Diagnoses and all orders for this visit:    Gout of left foot, unspecified cause, unspecified chronicity  -     Uric acid; Future  -     Comprehensive metabolic panel; Future  -     colchicine (COLCRYS) 0.6 mg tablet; Take 2 tablets now, then 1 tablet an hour from then, then 1 tablet daily for 4 days  -     XR foot 2 vw left; Future    Insomnia, unspecified type  -     traZODone (DESYREL) 50 mg tablet; Take 1 tablet (50 mg total) by mouth daily at bedtime       Gout  Started on treatment shown above.  Would like to see how the patient's uric acid levels are.  Trazodone to be started 50 mg at bedtime can increase the dose  Pending how patient responds      Depression Screening and Follow-up Plan: Patient's depression screening was positive with a PHQ-9 score of 6. Patient assessed for underlying major depression. Brief counseling provided and recommend additional follow-up/re-evaluation next office visit.     Tobacco Cessation Counseling: The patient is sincerely urged to quit consumption of tobacco. He is not ready to quit tobacco.           No future appointments.       SUBJECTIVE  CC: Follow-up (Left foot)      HPI:  Obed Bae is a 38 y.o. male who presents for an acute appointment.  Has had worsening left foot pain and states that has been very difficult for him to walk.  Has been swollen and red.  Patient states that he has been trying to cut down his alcohol intake.  He states that he is continuing to have insomnia problems.  That was the main reason why he was drinking.  Patient is hoping to get treatment for his insomnia if possible  Review of Systems   Constitutional:  Negative for activity change, appetite change, chills, fatigue and fever.   HENT:  Negative for congestion.     Respiratory:  Negative for cough, chest tightness and shortness of breath.    Cardiovascular:  Negative for chest pain and leg swelling.   Gastrointestinal:  Negative for abdominal distention, abdominal pain, constipation, diarrhea, nausea and vomiting.   Musculoskeletal:  Positive for arthralgias.   Psychiatric/Behavioral:  Positive for sleep disturbance.    All other systems reviewed and are negative.      Historical Information   The patient history was reviewed as follows:  Past Medical History:   Diagnosis Date   • Anxiety    • Depression    • 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     Onset date: 7/30/2010    • Eczema    • Lyme disease     Onset date: 7/5/2011    • Uncomplicated alcohol abuse     Resolved 9/24/2014          Medications:     Current Outpatient Medications:   •  colchicine (COLCRYS) 0.6 mg tablet, Take 2 tablets now, then 1 tablet an hour from then, then 1 tablet daily for 4 days, Disp: 7 tablet, Rfl: 0  •  cyclobenzaprine (FLEXERIL) 10 mg tablet, Take 1 tablet (10 mg total) by mouth 3 (three) times a day as needed for muscle spasms, Disp: 12 tablet, Rfl: 0  •  hydrocortisone (WESTCORT) 0.2 % cream, Apply topically 2 (two) times a day, Disp: 60 g, Rfl: 3  •  lidocaine (Lidoderm) 5 %, Apply 1 patch topically over 12 hours daily Remove & Discard patch within 12 hours or as directed by MD, Disp: 30 patch, Rfl: 0  •  lisinopril (ZESTRIL) 2.5 mg tablet, take 1 tablet by mouth once daily, Disp: 90 tablet, Rfl: 1  •  traZODone (DESYREL) 50 mg tablet, Take 1 tablet (50 mg total) by mouth daily at bedtime, Disp: 30 tablet, Rfl: 0  •  folic acid (FOLVITE) 1 mg tablet, Take 1 tablet (1 mg total) by mouth daily (Patient not taking: Reported on 6/8/2024), Disp: 90 tablet, Rfl: 1  •  Thiamine Mononitrate (VITAMIN B1) 100 mg tablet, Take 1 tablet (100 mg total) by mouth daily (Patient not taking: Reported on 6/8/2024), Disp: 90 tablet, Rfl: 1    No Known Allergies    OBJECTIVE  Vitals:   Vitals:    06/13/24 1610  "  BP: 136/90   BP Location: Left arm   Patient Position: Sitting   Cuff Size: Large   Pulse: 86   Resp: 18   Temp: 98.8 °F (37.1 °C)   TempSrc: Temporal   SpO2: 96%   Weight: 104 kg (229 lb)   Height: 5' 9.5\" (1.765 m)         Physical Exam  Vitals reviewed.   Constitutional:       Appearance: He is well-developed.   HENT:      Head: Normocephalic and atraumatic.   Eyes:      Extraocular Movements: EOM normal.      Conjunctiva/sclera: Conjunctivae normal.      Pupils: Pupils are equal, round, and reactive to light.   Cardiovascular:      Rate and Rhythm: Normal rate and regular rhythm.      Heart sounds: Normal heart sounds, S1 normal and S2 normal. No murmur heard.  Pulmonary:      Effort: Pulmonary effort is normal. No respiratory distress.      Breath sounds: Normal breath sounds. No wheezing.   Musculoskeletal:         General: No edema. Normal range of motion.      Cervical back: Normal range of motion and neck supple.   Skin:     General: Skin is warm.      Comments: Left plantar foot erythema tender to touch warm   Neurological:      Mental Status: He is alert and oriented to person, place, and time.   Psychiatric:         Mood and Affect: Mood and affect normal.         Speech: Speech normal.         Behavior: Behavior normal.         Thought Content: Thought content normal.         Judgment: Judgment normal.                    Jennifer Veliz MD,   [facility] (formerly [facility]3]  6/18/2024      "

## 2024-06-19 ENCOUNTER — TELEPHONE (OUTPATIENT)
Dept: FAMILY MEDICINE CLINIC | Facility: CLINIC | Age: 39
End: 2024-06-19

## 2024-06-19 ENCOUNTER — APPOINTMENT (OUTPATIENT)
Dept: RADIOLOGY | Facility: CLINIC | Age: 39
End: 2024-06-19
Payer: COMMERCIAL

## 2024-06-19 DIAGNOSIS — M10.9 GOUT OF LEFT FOOT, UNSPECIFIED CAUSE, UNSPECIFIED CHRONICITY: ICD-10-CM

## 2024-06-19 PROCEDURE — 73620 X-RAY EXAM OF FOOT: CPT

## 2024-06-19 NOTE — TELEPHONE ENCOUNTER
Dr. Veliz:    Patient states that he was feeling better but as soon as patient finished medication sxs returned and he is in pain.  He just went for xray this morning.  He is only available after 4 tomorrow.

## 2024-06-19 NOTE — TELEPHONE ENCOUNTER
----- Message from Jennifer Mena MD sent at 6/18/2024 10:28 PM EDT -----  Please see how the patient is feeling from a gout standpoint

## 2024-06-20 DIAGNOSIS — M10.9 GOUT OF LEFT FOOT, UNSPECIFIED CAUSE, UNSPECIFIED CHRONICITY: ICD-10-CM

## 2024-06-20 RX ORDER — COLCHICINE 0.6 MG/1
TABLET ORAL
Qty: 7 TABLET | Refills: 0 | Status: SHIPPED | OUTPATIENT
Start: 2024-06-20

## 2024-06-21 ENCOUNTER — TELEPHONE (OUTPATIENT)
Dept: FAMILY MEDICINE CLINIC | Facility: CLINIC | Age: 39
End: 2024-06-21

## 2024-06-21 NOTE — TELEPHONE ENCOUNTER
----- Message from Jennifer Mena MD sent at 6/20/2024 11:33 PM EDT -----  Please advise the patient that I have sent in colchicine for him to really take.  X-ray does show inflammation.  I would like the patient to be seen by podiatry in order for him to get a steroid injection localized to the joint.  Given that he is unable to tolerate prednisone via pill.  Referral has been given

## 2024-06-28 ENCOUNTER — HOSPITAL ENCOUNTER (EMERGENCY)
Facility: HOSPITAL | Age: 39
Discharge: HOME | End: 2024-06-28
Attending: EMERGENCY MEDICINE
Payer: COMMERCIAL

## 2024-06-28 ENCOUNTER — APPOINTMENT (EMERGENCY)
Dept: RADIOLOGY | Facility: HOSPITAL | Age: 39
End: 2024-06-28
Payer: COMMERCIAL

## 2024-06-28 VITALS
RESPIRATION RATE: 18 BRPM | HEIGHT: 70 IN | WEIGHT: 230 LBS | BODY MASS INDEX: 32.93 KG/M2 | HEART RATE: 78 BPM | DIASTOLIC BLOOD PRESSURE: 77 MMHG | SYSTOLIC BLOOD PRESSURE: 130 MMHG | TEMPERATURE: 98.4 F | OXYGEN SATURATION: 99 %

## 2024-06-28 DIAGNOSIS — F41.0 ANXIETY ATTACK: ICD-10-CM

## 2024-06-28 DIAGNOSIS — R11.10 VOMITING: Primary | ICD-10-CM

## 2024-06-28 LAB
ALBUMIN SERPL BCG-MCNC: 4.9 G/DL (ref 3.5–5)
ALP SERPL-CCNC: 55 U/L (ref 34–104)
ALT SERPL W P-5'-P-CCNC: 55 U/L (ref 7–52)
ANION GAP SERPL CALCULATED.3IONS-SCNC: 9 MMOL/L (ref 4–13)
AST SERPL W P-5'-P-CCNC: 35 U/L (ref 13–39)
ATRIAL RATE: 95 BPM
BASOPHILS # BLD AUTO: 0.02 THOUSANDS/ÂΜL (ref 0–0.1)
BASOPHILS NFR BLD AUTO: 0 % (ref 0–1)
BILIRUB SERPL-MCNC: 0.91 MG/DL (ref 0.2–1)
BUN SERPL-MCNC: 11 MG/DL (ref 5–25)
CALCIUM SERPL-MCNC: 10 MG/DL (ref 8.4–10.2)
CARDIAC TROPONIN I PNL SERPL HS: <2 NG/L
CARDIAC TROPONIN I PNL SERPL HS: <2 NG/L
CHLORIDE SERPL-SCNC: 102 MMOL/L (ref 96–108)
CO2 SERPL-SCNC: 23 MMOL/L (ref 21–32)
CREAT SERPL-MCNC: 0.74 MG/DL (ref 0.6–1.3)
EOSINOPHIL # BLD AUTO: 0.06 THOUSAND/ÂΜL (ref 0–0.61)
EOSINOPHIL NFR BLD AUTO: 1 % (ref 0–6)
ERYTHROCYTE [DISTWIDTH] IN BLOOD BY AUTOMATED COUNT: 13 % (ref 11.6–15.1)
ETHANOL SERPL-MCNC: <10 MG/DL
GFR SERPL CREATININE-BSD FRML MDRD: 116 ML/MIN/1.73SQ M
GLUCOSE SERPL-MCNC: 198 MG/DL (ref 65–140)
GLUCOSE SERPL-MCNC: 209 MG/DL (ref 65–140)
HCT VFR BLD AUTO: 45.1 % (ref 36.5–49.3)
HGB BLD-MCNC: 15.4 G/DL (ref 12–17)
IMM GRANULOCYTES # BLD AUTO: 0.02 THOUSAND/UL (ref 0–0.2)
IMM GRANULOCYTES NFR BLD AUTO: 0 % (ref 0–2)
LYMPHOCYTES # BLD AUTO: 1.24 THOUSANDS/ÂΜL (ref 0.6–4.47)
LYMPHOCYTES NFR BLD AUTO: 16 % (ref 14–44)
MCH RBC QN AUTO: 32.6 PG (ref 26.8–34.3)
MCHC RBC AUTO-ENTMCNC: 34.1 G/DL (ref 31.4–37.4)
MCV RBC AUTO: 95 FL (ref 82–98)
MONOCYTES # BLD AUTO: 0.59 THOUSAND/ÂΜL (ref 0.17–1.22)
MONOCYTES NFR BLD AUTO: 8 % (ref 4–12)
NEUTROPHILS # BLD AUTO: 5.89 THOUSANDS/ÂΜL (ref 1.85–7.62)
NEUTS SEG NFR BLD AUTO: 75 % (ref 43–75)
NRBC BLD AUTO-RTO: 0 /100 WBCS
P AXIS: 62 DEGREES
PLATELET # BLD AUTO: 271 THOUSANDS/UL (ref 149–390)
PMV BLD AUTO: 11.2 FL (ref 8.9–12.7)
POTASSIUM SERPL-SCNC: 3.6 MMOL/L (ref 3.5–5.3)
PR INTERVAL: 132 MS
PROT SERPL-MCNC: 7.9 G/DL (ref 6.4–8.4)
QRS AXIS: 32 DEGREES
QRSD INTERVAL: 86 MS
QT INTERVAL: 334 MS
QTC INTERVAL: 419 MS
RBC # BLD AUTO: 4.73 MILLION/UL (ref 3.88–5.62)
SODIUM SERPL-SCNC: 134 MMOL/L (ref 135–147)
T WAVE AXIS: 57 DEGREES
VENTRICULAR RATE: 95 BPM
WBC # BLD AUTO: 7.82 THOUSAND/UL (ref 4.31–10.16)

## 2024-06-28 PROCEDURE — 93005 ELECTROCARDIOGRAM TRACING: CPT

## 2024-06-28 PROCEDURE — 96360 HYDRATION IV INFUSION INIT: CPT

## 2024-06-28 PROCEDURE — 82077 ASSAY SPEC XCP UR&BREATH IA: CPT | Performed by: EMERGENCY MEDICINE

## 2024-06-28 PROCEDURE — 82948 REAGENT STRIP/BLOOD GLUCOSE: CPT

## 2024-06-28 PROCEDURE — 80053 COMPREHEN METABOLIC PANEL: CPT | Performed by: EMERGENCY MEDICINE

## 2024-06-28 PROCEDURE — 93010 ELECTROCARDIOGRAM REPORT: CPT | Performed by: INTERNAL MEDICINE

## 2024-06-28 PROCEDURE — 36415 COLL VENOUS BLD VENIPUNCTURE: CPT | Performed by: EMERGENCY MEDICINE

## 2024-06-28 PROCEDURE — 99284 EMERGENCY DEPT VISIT MOD MDM: CPT | Performed by: EMERGENCY MEDICINE

## 2024-06-28 PROCEDURE — 70450 CT HEAD/BRAIN W/O DYE: CPT

## 2024-06-28 PROCEDURE — 99285 EMERGENCY DEPT VISIT HI MDM: CPT

## 2024-06-28 PROCEDURE — 84484 ASSAY OF TROPONIN QUANT: CPT | Performed by: EMERGENCY MEDICINE

## 2024-06-28 PROCEDURE — 85025 COMPLETE CBC W/AUTO DIFF WBC: CPT | Performed by: EMERGENCY MEDICINE

## 2024-06-28 RX ORDER — HYDROXYZINE HYDROCHLORIDE 25 MG/1
25 TABLET, FILM COATED ORAL EVERY 6 HOURS
Qty: 12 TABLET | Refills: 0 | Status: SHIPPED | OUTPATIENT
Start: 2024-06-28

## 2024-06-28 RX ADMIN — SODIUM CHLORIDE 1000 ML: 0.9 INJECTION, SOLUTION INTRAVENOUS at 12:47

## 2024-06-28 NOTE — Clinical Note
Obed Bae was seen and treated in our emergency department on 6/28/2024.                Diagnosis:     Obed  may return to work on return date.    He may return on this date: 06/29/2024         If you have any questions or concerns, please don't hesitate to call.      Nile Garcia MD    ______________________________           _______________          _______________  Hospital Representative                              Date                                Time

## 2024-06-28 NOTE — ED PROVIDER NOTES
History  Chief Complaint   Patient presents with    Vomiting     Started vomiting around 5am and at 9am feels like he has blurred and tunnel vision     Patient states he vomited earlier this morning around 5 AM.  Earlier today when he was at work he noted return of nausea with associated malaise, lightheadedness weakness with blurry vision.  Patient states he felt like he was going to pass out but did not lose consciousness.  He became more more concerned and states that he thinks he may have had a panic attack.  Arrives awake and alert states he feels still somewhat nauseous and lightheaded but politely refuses medications.  States that he feels better since arrival        Prior to Admission Medications   Prescriptions Last Dose Informant Patient Reported? Taking?   Thiamine Mononitrate (VITAMIN B1) 100 mg tablet   No No   Sig: Take 1 tablet (100 mg total) by mouth daily   Patient not taking: Reported on 6/8/2024   colchicine (COLCRYS) 0.6 mg tablet   No No   Sig: Take 2 tablets now, then 1 tablet an hour from then, then 1 tablet daily for 4 days   cyclobenzaprine (FLEXERIL) 10 mg tablet   No No   Sig: Take 1 tablet (10 mg total) by mouth 3 (three) times a day as needed for muscle spasms   folic acid (FOLVITE) 1 mg tablet   No No   Sig: Take 1 tablet (1 mg total) by mouth daily   Patient not taking: Reported on 6/8/2024   hydrocortisone (WESTCORT) 0.2 % cream   No No   Sig: Apply topically 2 (two) times a day   lidocaine (Lidoderm) 5 %   No No   Sig: Apply 1 patch topically over 12 hours daily Remove & Discard patch within 12 hours or as directed by MD   lisinopril (ZESTRIL) 2.5 mg tablet   No No   Sig: take 1 tablet by mouth once daily   traZODone (DESYREL) 50 mg tablet   No No   Sig: Take 1 tablet (50 mg total) by mouth daily at bedtime      Facility-Administered Medications: None       Past Medical History:   Diagnosis Date    Anxiety     Depression     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     Onset date: 7/30/2010      Eczema     Lyme disease     Onset date: 7/5/2011     Uncomplicated alcohol abuse     Resolved 9/24/2014        Past Surgical History:   Procedure Laterality Date    APPENDECTOMY      SINUS SURGERY      TONSILLECTOMY         History reviewed. No pertinent family history.  I have reviewed and agree with the history as documented.    E-Cigarette/Vaping    E-Cigarette Use Never User      E-Cigarette/Vaping Substances     Social History     Tobacco Use    Smoking status: Every Day     Current packs/day: 1.00     Average packs/day: 1 pack/day for 10.0 years (10.0 ttl pk-yrs)     Types: Cigarettes     Passive exposure: Current    Smokeless tobacco: Never   Vaping Use    Vaping status: Never Used   Substance Use Topics    Alcohol use: Yes     Alcohol/week: 12.0 standard drinks of alcohol     Types: 12 Shots of liquor per week    Drug use: Yes     Types: Marijuana       Review of Systems   Constitutional:  Negative for chills and fever.   HENT:  Negative for congestion and sore throat.    Eyes:  Positive for visual disturbance. Negative for photophobia.   Respiratory:  Negative for cough and shortness of breath.    Cardiovascular:  Negative for chest pain and leg swelling.   Gastrointestinal:  Positive for nausea and vomiting. Negative for abdominal pain.   Genitourinary:  Negative for decreased urine volume and dysuria.   Musculoskeletal:  Negative for back pain.   Skin:  Negative for rash.   Neurological:  Positive for weakness and light-headedness. Negative for syncope.   Hematological:  Does not bruise/bleed easily.   Psychiatric/Behavioral:  The patient is nervous/anxious.    All other systems reviewed and are negative.      Physical Exam  Physical Exam  Vitals and nursing note reviewed.   Constitutional:       Appearance: Normal appearance.   HENT:      Head: Normocephalic.      Right Ear: External ear normal.      Left Ear: External ear normal.      Nose: Nose normal.      Mouth/Throat:      Mouth: Mucous membranes  are moist.   Eyes:      Extraocular Movements: Extraocular movements intact.      Conjunctiva/sclera: Conjunctivae normal.   Cardiovascular:      Rate and Rhythm: Normal rate and regular rhythm.      Pulses: Normal pulses.   Pulmonary:      Effort: Pulmonary effort is normal.   Abdominal:      Palpations: Abdomen is soft.      Tenderness: There is no abdominal tenderness.   Musculoskeletal:         General: Normal range of motion.      Cervical back: Normal range of motion.   Skin:     General: Skin is warm and dry.      Capillary Refill: Capillary refill takes less than 2 seconds.   Neurological:      General: No focal deficit present.      Mental Status: He is alert and oriented to person, place, and time.   Psychiatric:         Mood and Affect: Mood normal.         Vital Signs  ED Triage Vitals   Temperature Pulse Respirations Blood Pressure SpO2   06/28/24 1222 06/28/24 1224 06/28/24 1222 06/28/24 1224 06/28/24 1224   98.4 °F (36.9 °C) 101 18 (!) 178/103 97 %      Temp src Heart Rate Source Patient Position - Orthostatic VS BP Location FiO2 (%)   -- 06/28/24 1332 06/28/24 1332 06/28/24 1332 --    Monitor Lying Left arm       Pain Score       06/28/24 1222       No Pain           Vitals:    06/28/24 1224 06/28/24 1230 06/28/24 1332 06/28/24 1515   BP: (!) 178/103 (!) 183/99 139/82 130/77   Pulse: 101 94 98 78   Patient Position - Orthostatic VS:   Lying          Visual Acuity      ED Medications  Medications   sodium chloride 0.9 % bolus 1,000 mL (0 mL Intravenous Stopped 6/28/24 1347)       Diagnostic Studies  Results Reviewed       Procedure Component Value Units Date/Time    HS Troponin I 2hr [[id number]] Collected: 06/28/24 1442    Lab Status: Final result Specimen: Blood from Arm, Right Updated: 06/28/24 1508     hs TnI 2hr <2 ng/L      Delta 2hr hsTnI --    HS Troponin I 4hr [[id number]]     Lab Status: No result Specimen: Blood     HS Troponin 0hr (reflex protocol) [[id number]]  (Normal) Collected: 06/28/24  1246    Lab Status: Final result Specimen: Blood from Arm, Left Updated: 06/28/24 1315     hs TnI 0hr <2 ng/L     Comprehensive metabolic panel [[id number]]  (Abnormal) Collected: 06/28/24 1246    Lab Status: Final result Specimen: Blood from Arm, Left Updated: 06/28/24 1312     Sodium 134 mmol/L      Potassium 3.6 mmol/L      Chloride 102 mmol/L      CO2 23 mmol/L      ANION GAP 9 mmol/L      BUN 11 mg/dL      Creatinine 0.74 mg/dL      Glucose 198 mg/dL      Calcium 10.0 mg/dL      AST 35 U/L      ALT 55 U/L      Alkaline Phosphatase 55 U/L      Total Protein 7.9 g/dL      Albumin 4.9 g/dL      Total Bilirubin 0.91 mg/dL      eGFR 116 ml/min/1.73sq m     Narrative:      National Kidney Disease Foundation guidelines for Chronic Kidney Disease (CKD):     Stage 1 with normal or high GFR (GFR > 90 mL/min/1.73 square meters)    Stage 2 Mild CKD (GFR = 60-89 mL/min/1.73 square meters)    Stage 3A Moderate CKD (GFR = 45-59 mL/min/1.73 square meters)    Stage 3B Moderate CKD (GFR = 30-44 mL/min/1.73 square meters)    Stage 4 Severe CKD (GFR = 15-29 mL/min/1.73 square meters)    Stage 5 End Stage CKD (GFR <15 mL/min/1.73 square meters)  Note: GFR calculation is accurate only with a steady state creatinine    Ethanol [[id number]]  (Normal) Collected: 06/28/24 1246    Lab Status: Final result Specimen: Blood from Arm, Left Updated: 06/28/24 1312     Ethanol Lvl <10 mg/dL     CBC and differential [[id number]] Collected: 06/28/24 1246    Lab Status: Final result Specimen: Blood from Arm, Left Updated: 06/28/24 1257     WBC 7.82 Thousand/uL      RBC 4.73 Million/uL      Hemoglobin 15.4 g/dL      Hematocrit 45.1 %      MCV 95 fL      MCH 32.6 pg      MCHC 34.1 g/dL      RDW 13.0 %      MPV 11.2 fL      Platelets 271 Thousands/uL      nRBC 0 /100 WBCs      Segmented % 75 %      Immature Grans % 0 %      Lymphocytes % 16 %      Monocytes % 8 %      Eosinophils Relative 1 %      Basophils Relative 0 %      Absolute Neutrophils 5.89  Thousands/µL      Absolute Immature Grans 0.02 Thousand/uL      Absolute Lymphocytes 1.24 Thousands/µL      Absolute Monocytes 0.59 Thousand/µL      Eosinophils Absolute 0.06 Thousand/µL      Basophils Absolute 0.02 Thousands/µL     Fingerstick Glucose (POCT) [[id number]]  (Abnormal) Collected: 06/28/24 1235    Lab Status: Final result Specimen: Blood Updated: 06/28/24 1237     POC Glucose 209 mg/dl                    CT head without contrast   Final Result by Johnathan Drummond MD (06/28 1419)      No acute intracranial abnormality.                  Workstation performed: [id number]                    Procedures  ECG 12 Lead Documentation Only    Date/Time: 6/28/2024 12:30 PM    Performed by: Nile Garcia MD  Authorized by: Nile Garcia MD    Indications / Diagnosis:  Weakness vomiting  ECG reviewed by me, the ED Provider: yes    Patient location:  ED  Interpretation:     Interpretation: normal    Rate:     ECG rate:  95    ECG rate assessment: normal    Rhythm:     Rhythm: sinus rhythm    Ectopy:     Ectopy: none    QRS:     QRS axis:  Normal    QRS intervals:  Normal  Conduction:     Conduction: normal    ST segments:     ST segments:  Normal  T waves:     T waves: normal             ED Course                               SBIRT 22yo+      Flowsheet Row Most Recent Value   Initial Alcohol Screen: US AUDIT-C     1. How often do you have a drink containing alcohol? 0 Filed at: 06/28/2024 1222   2. How many drinks containing alcohol do you have on a typical day you are drinking?  0 Filed at: 06/28/2024 1222   3a. Male UNDER 65: How often do you have five or more drinks on one occasion? 0 Filed at: 06/28/2024 1222   3b. FEMALE Any Age, or MALE 65+: How often do you have 4 or more drinks on one occassion? 0 Filed at: 06/28/2024 1222   Audit-C Score 0 Filed at: 06/28/2024 1222   [place]: How many times in the past year have you...    Used an illegal drug or used a prescription medication for non-medical reasons?  Never Filed at: 06/28/2024 1222                      Medical Decision Making  Amount and/or Complexity of Data Reviewed  Labs: ordered.  Radiology: ordered.    Risk  Prescription drug management.             Disposition  Final diagnoses:   Vomiting   Anxiety attack     Time reflects when diagnosis was documented in both MDM as applicable and the Disposition within this note       Time User Action Codes Description Comment    6/28/2024  3:24 PM Nile Garcia Add [R11.10] Vomiting     6/28/2024  3:24 PM Nile Garcia Add [F41.0] Anxiety attack           ED Disposition       ED Disposition   Discharge    Condition   Stable    Date/Time   Fri Jun 28, 2024 1524    Comment   Obed KRISTIN Bae discharge to home/self care.                   Follow-up Information       Follow up With Specialties Details Why Contact Info    Jennifer Mena MD Family Medicine Schedule an appointment as soon as possible for a visit   [address] [place] [id number]  [phone]              Discharge Medication List as of 6/28/2024  3:24 PM        START taking these medications    Details   hydrOXYzine HCL (ATARAX) 25 mg tablet Take 1 tablet (25 mg total) by mouth every 6 (six) hours, Starting Fri 6/28/2024, Normal           CONTINUE these medications which have NOT CHANGED    Details   colchicine (COLCRYS) 0.6 mg tablet Take 2 tablets now, then 1 tablet an hour from then, then 1 tablet daily for 4 days, Normal      cyclobenzaprine (FLEXERIL) 10 mg tablet Take 1 tablet (10 mg total) by mouth 3 (three) times a day as needed for muscle spasms, Starting Sat 6/8/2024, Normal      folic acid (FOLVITE) 1 mg tablet Take 1 tablet (1 mg total) by mouth daily, Starting Mon 7/3/2023, Normal      hydrocortisone (WESTCORT) 0.2 % cream Apply topically 2 (two) times a day, Starting Fri 10/27/2023, Normal      lidocaine (Lidoderm) 5 % Apply 1 patch topically over 12 hours daily Remove & Discard patch within 12 hours or as directed by  MD, Starting Sat 6/8/2024, Normal      lisinopril (ZESTRIL) 2.5 mg tablet take 1 tablet by mouth once daily, Normal      Thiamine Mononitrate (VITAMIN B1) 100 mg tablet Take 1 tablet (100 mg total) by mouth daily, Starting Mon 7/3/2023, Normal      traZODone (DESYREL) 50 mg tablet Take 1 tablet (50 mg total) by mouth daily at bedtime, Starting Thu 6/13/2024, Normal             No discharge procedures on file.    PDMP Review       None            ED Provider  Electronically Signed by             Nile Garcia MD  06/28/24 4957

## 2024-06-29 NOTE — PROGRESS NOTES
A1c 5.5 in office today. Patient had sugary drink prior to ED. Likely the cause of elevation. Patient had anxiety attack, improving slightly. He will try on Atarax as needed

## 2024-07-02 ENCOUNTER — OFFICE VISIT (OUTPATIENT)
Age: 39
End: 2024-07-02
Payer: COMMERCIAL

## 2024-07-02 VITALS
SYSTOLIC BLOOD PRESSURE: 138 MMHG | HEIGHT: 70 IN | HEART RATE: 80 BPM | WEIGHT: 230 LBS | DIASTOLIC BLOOD PRESSURE: 82 MMHG | BODY MASS INDEX: 32.93 KG/M2

## 2024-07-02 DIAGNOSIS — M76.822 POSTERIOR TIBIAL TENDINITIS OF LEFT LOWER EXTREMITY: Primary | ICD-10-CM

## 2024-07-02 DIAGNOSIS — M10.9 GOUT OF LEFT FOOT, UNSPECIFIED CAUSE, UNSPECIFIED CHRONICITY: ICD-10-CM

## 2024-07-02 PROCEDURE — 99203 OFFICE O/P NEW LOW 30 MIN: CPT | Performed by: STUDENT IN AN ORGANIZED HEALTH CARE EDUCATION/TRAINING PROGRAM

## 2024-07-02 RX ORDER — MELOXICAM 15 MG/1
15 TABLET ORAL DAILY
Qty: 30 TABLET | Refills: 0 | Status: SHIPPED | OUTPATIENT
Start: 2024-07-02

## 2024-07-02 NOTE — PATIENT INSTRUCTIONS
Purchase a supportive pair of sneakers such as [employer], [employer], [employer], [employer] Balance.  Some qualities to look for is that the shoe should bend only where the toes bend, the sole should not be too flimsy, it should have a wide toe box and a somewhat stiff heel support. Purchase a supportive over-the-counter pair of inserts such as Superfeet, powersteps, tread labs.    [employer]  [address] [facility] [address]  [phone]     [facility]  [address] [facility] [address], [place] [address]  [phone]     [employer]  [address] [place] [address] [phone]     [employer] [employer]   [address] [facility]  [phone]     [employer]  [address], [place] [address]  [phone]     [employer]  [address] [facility] [phone]     [facility] [facility]   [address] [place] [address]  [phone]     [employer]  [address] [place], [place]  [phone]     [employer] [address], [id number] [phone]     [employer] [address], [address], [place] [address] [phone]

## 2024-07-02 NOTE — PROGRESS NOTES
[facility] Podiatric Medicine and Surgery Office Visit    ASSESSMENT     Diagnoses and all orders for this visit:    Posterior tibial tendinitis of left lower extremity  -     meloxicam (Mobic) 15 mg tablet; Take 1 tablet (15 mg total) by mouth daily  -     Ankle Cude ankle/Ankle Brace    Gout of left foot, unspecified cause, unspecified chronicity  -     Ambulatory Referral to Podiatry         Problem List Items Addressed This Visit          Musculoskeletal and Integument    Posterior tibial tendinitis of left lower extremity - Primary    Relevant Medications    meloxicam (Mobic) 15 mg tablet    Other Relevant Orders    Ankle Cude ankle/Ankle Brace     Other Visit Diagnoses       Gout of left foot, unspecified cause, unspecified chronicity              PLAN  -The patient and I thoroughly discussed their ankle pain  -Rx meloxicam  -Recommend Voltaren gel to be applied 4 times daily  -Rx ASO brace  -Recommend limiting exercise for now  -Return to clinic 4 weeks, will consider formal course of physical therapy at this time if no improvements are made    SUBJECTIVE    Chief Complaint:  Left foot pain     Patient ID: Obed Bae     7/2/2024: Obed is a pleasant 38-year-old male who presents today for evaluation of his left ankle.  He states that he has had pain in the left ankle for about the past 5 to 6 weeks.  He states that it hurts worse to while he is walking on it and is better at rest.  He states that he did not injure his foot at all.  He states that for treatment he has attempted 2 courses of colchicine which have helped slightly but have not taken the pain completely away.        The following portions of the patient's history were reviewed and updated as appropriate: allergies, current medications, past family history, past medical history, past social history, past surgical history and problem list.    Review of Systems   Constitutional: Negative.    Respiratory: Negative.     Cardiovascular:  "Negative.    Gastrointestinal: Negative.    Genitourinary: Negative.    Musculoskeletal:  Positive for arthralgias and myalgias.   Skin: Negative.          OBJECTIVE      /82   Pulse 80   Ht 5' 9.5\" (1.765 m)   Wt 104 kg (230 lb)   BMI 33.48 kg/m²        Physical Exam  Constitutional:       Appearance: Normal appearance.   HENT:      Head: Normocephalic and atraumatic.   Eyes:      General:         Right eye: No discharge.         Left eye: No discharge.   Cardiovascular:      Rate and Rhythm: Normal rate and regular rhythm.      Pulses:           Dorsalis pedis pulses are 2+ on the right side and 2+ on the left side.        Posterior tibial pulses are 2+ on the right side and 2+ on the left side.   Pulmonary:      Effort: Pulmonary effort is normal.      Breath sounds: Normal breath sounds.   Skin:     General: Skin is warm.      Capillary Refill: Capillary refill takes less than 2 seconds.   Neurological:      Mental Status: He is alert and oriented to person, place, and time.      Sensory: Sensation is intact. No sensory deficit.   Psychiatric:         Mood and Affect: Mood normal.         MSK:  -Pain on palpation to left medial ankle posterior and inferior to the medial malleolus along the course the posterior tibial tendon extending distally to the navicular tuberosity  -Active range of motion lesser digits intact  -First MTPJ range of motion within normal limits bilaterally  -Ankle dorsiflexion less than 10 degrees with knee extended, this is improved with knee flexion  -Single-leg heel rise: Able to perform with moderate pain  -standing exam reveals too many toes sign, calcaneal eversion. Calcaneus eversion is reducible suggesting a flexible deformity  -MMT:    -Tibialis anterior 5/5   -Tibialis posterior 5/5   -Peroneals 5/5   -Triceps surae 5/5    Derm:  -No lesions, abrasions, or open wounds noted  -No noted interdigital maceration, peeling, malodor  -No callus formation noted on exam            "

## 2024-07-06 DIAGNOSIS — G47.00 INSOMNIA, UNSPECIFIED TYPE: ICD-10-CM

## 2024-07-07 RX ORDER — TRAZODONE HYDROCHLORIDE 50 MG/1
50 TABLET ORAL
Qty: 30 TABLET | Refills: 5 | Status: SHIPPED | OUTPATIENT
Start: 2024-07-07

## 2024-07-09 ENCOUNTER — OFFICE VISIT (OUTPATIENT)
Dept: FAMILY MEDICINE CLINIC | Facility: CLINIC | Age: 39
End: 2024-07-09
Payer: COMMERCIAL

## 2024-07-09 VITALS
HEIGHT: 70 IN | BODY MASS INDEX: 31.18 KG/M2 | DIASTOLIC BLOOD PRESSURE: 98 MMHG | OXYGEN SATURATION: 97 % | SYSTOLIC BLOOD PRESSURE: 132 MMHG | HEART RATE: 88 BPM | RESPIRATION RATE: 16 BRPM | TEMPERATURE: 98.5 F | WEIGHT: 217.8 LBS

## 2024-07-09 DIAGNOSIS — F43.21 SITUATIONAL DEPRESSION: Primary | ICD-10-CM

## 2024-07-09 PROCEDURE — 99214 OFFICE O/P EST MOD 30 MIN: CPT | Performed by: FAMILY MEDICINE

## 2024-07-09 NOTE — PROGRESS NOTES
Chief Complaint   Patient presents with   • Depression     Patient said for the last week it has been severe. Patient has some family issues going on . He was helping his niece out (19 years old ) and she left          Patient ID: Obed Bae is a 39 y.o. male.    Depression  This is a new problem. Episode onset: 2 wks ago. The problem occurs constantly. The problem has been unchanged. Associated symptoms include anorexia. Pertinent negatives include no abdominal pain, arthralgias, change in bowel habit, chest pain, chills, congestion, coughing, fatigue, fever, headaches, joint swelling, myalgias, nausea, neck pain, numbness, rash, sore throat, swollen glands, urinary symptoms, vertigo, visual change, vomiting or weakness. Nothing aggravates the symptoms. He has tried nothing for the symptoms. The treatment provided no relief.   No SI -  remote h/o depression -  was on meds -  stopped over 10 y ago       The following portions of the patient's history were reviewed and updated as appropriate: allergies, current medications, past family history, past medical history, past social history, past surgical history and problem list.    Review of Systems   Constitutional:  Negative for chills, fatigue and fever.   HENT:  Negative for congestion and sore throat.    Respiratory:  Negative for cough.    Cardiovascular:  Negative for chest pain.   Gastrointestinal:  Positive for anorexia. Negative for abdominal pain, change in bowel habit, nausea and vomiting.   Musculoskeletal:  Negative for arthralgias, joint swelling, myalgias and neck pain.   Skin:  Negative for rash.   Neurological:  Negative for vertigo, weakness, numbness and headaches.   Psychiatric/Behavioral:  Positive for decreased concentration, depression, dysphoric mood and sleep disturbance. Negative for suicidal ideas.        Current Outpatient Medications   Medication Sig Dispense Refill   • hydrOXYzine HCL (ATARAX) 25 mg tablet Take 1 tablet (25 mg total)  "by mouth every 6 (six) hours 12 tablet 0   • lisinopril (ZESTRIL) 2.5 mg tablet take 1 tablet by mouth once daily 90 tablet 1   • traZODone (DESYREL) 50 mg tablet take 1 tablet by mouth daily at bedtime 30 tablet 5   • colchicine (COLCRYS) 0.6 mg tablet Take 2 tablets now, then 1 tablet an hour from then, then 1 tablet daily for 4 days (Patient not taking: Reported on 7/9/2024) 7 tablet 0   • cyclobenzaprine (FLEXERIL) 10 mg tablet Take 1 tablet (10 mg total) by mouth 3 (three) times a day as needed for muscle spasms (Patient not taking: Reported on 7/9/2024) 12 tablet 0   • folic acid (FOLVITE) 1 mg tablet Take 1 tablet (1 mg total) by mouth daily (Patient not taking: Reported on 6/8/2024) 90 tablet 1   • hydrocortisone (WESTCORT) 0.2 % cream Apply topically 2 (two) times a day (Patient not taking: Reported on 7/9/2024) 60 g 3   • lidocaine (Lidoderm) 5 % Apply 1 patch topically over 12 hours daily Remove & Discard patch within 12 hours or as directed by MD (Patient not taking: Reported on 7/9/2024) 30 patch 0   • meloxicam (Mobic) 15 mg tablet Take 1 tablet (15 mg total) by mouth daily (Patient not taking: Reported on 7/9/2024) 30 tablet 0   • Thiamine Mononitrate (VITAMIN B1) 100 mg tablet Take 1 tablet (100 mg total) by mouth daily (Patient not taking: Reported on 6/8/2024) 90 tablet 1     No current facility-administered medications for this visit.       Objective:    /98 (BP Location: Left arm, Patient Position: Sitting, Cuff Size: Large)   Pulse 88   Temp 98.5 °F (36.9 °C)   Resp 16   Ht 5' 9.5\" (1.765 m)   Wt 98.8 kg (217 lb 12.8 oz)   SpO2 97%   BMI 31.70 kg/m²        Physical Exam  Constitutional:       General: He is not in acute distress.     Appearance: He is not ill-appearing.   Cardiovascular:      Rate and Rhythm: Normal rate.   Pulmonary:      Effort: Pulmonary effort is normal. No respiratory distress.   Neurological:      Mental Status: He is alert and oriented to person, place, and " time.      Cranial Nerves: No cranial nerve deficit.   Psychiatric:         Mood and Affect: Mood is depressed. Affect is tearful.         Thought Content: Thought content normal.         Judgment: Judgment normal.                 Assessment/Plan:         Diagnoses and all orders for this visit:    Situational depression  -     sertraline (ZOLOFT) 50 mg tablet; Take 1 tablet (50 mg total) by mouth daily        Rto in 3 wks with PCP                     Jessica Zuniga MD

## 2024-07-22 ENCOUNTER — TELEPHONE (OUTPATIENT)
Age: 39
End: 2024-07-22

## 2024-07-22 NOTE — TELEPHONE ENCOUNTER
Patients sunny Jiang called in asking if patient could get a referral to be seen somewhere for his hernia.   Please advise once referral is placed, thank you

## 2024-07-25 ENCOUNTER — OFFICE VISIT (OUTPATIENT)
Dept: FAMILY MEDICINE CLINIC | Facility: CLINIC | Age: 39
End: 2024-07-25
Payer: COMMERCIAL

## 2024-07-25 VITALS
BODY MASS INDEX: 30.58 KG/M2 | HEART RATE: 88 BPM | HEIGHT: 70 IN | WEIGHT: 213.6 LBS | DIASTOLIC BLOOD PRESSURE: 80 MMHG | TEMPERATURE: 98.6 F | RESPIRATION RATE: 18 BRPM | OXYGEN SATURATION: 97 % | SYSTOLIC BLOOD PRESSURE: 116 MMHG

## 2024-07-25 DIAGNOSIS — K42.9 UMBILICAL HERNIA WITHOUT OBSTRUCTION AND WITHOUT GANGRENE: Primary | ICD-10-CM

## 2024-07-25 DIAGNOSIS — R21 RASH: ICD-10-CM

## 2024-07-25 DIAGNOSIS — G47.00 INSOMNIA, UNSPECIFIED TYPE: ICD-10-CM

## 2024-07-25 PROCEDURE — 99214 OFFICE O/P EST MOD 30 MIN: CPT | Performed by: FAMILY MEDICINE

## 2024-07-25 RX ORDER — TRAZODONE HYDROCHLORIDE 100 MG/1
100 TABLET ORAL
Qty: 90 TABLET | Refills: 1 | Status: SHIPPED | OUTPATIENT
Start: 2024-07-25

## 2024-07-25 NOTE — PROGRESS NOTES
Obed Bae 1985 male MRN: [id number]    [facility] PRACTICE OFFICE VISIT  [facility] - [facility]      ASSESSMENT/PLAN  Obed Bae is a 39 y.o. male presents to the office for    Diagnoses and all orders for this visit:    Umbilical hernia without obstruction and without gangrene  -     Ambulatory Referral to General Surgery; Future    Insomnia, unspecified type  -     traZODone (DESYREL) 100 mg tablet; Take 1 tablet (100 mg total) by mouth daily at bedtime    Rash       Consider Ativan 10 tablets x6 months   Hernia Umbilical Uncomfortable when lifting things, has increased in size. Recommend surgical consult   Increase trazodone to 100 mg  Rash continue to use topicals as needed         Future Appointments   Date Time Provider Department Center   8/1/2024  3:00 PM Jennifer Mena MD [facility]   8/5/2024  3:00 PM Viet Auguste MD GEN SURG [facility]   8/15/2024  4:00 PM Ham Rodrigez DPM [facility]          SUBJECTIVE  CC: Hernia (Flare up)      HPI:  Obed Bae is a 39 y.o. male who presents for an acute appointment.  Patient is concerned that his umbilical hernia has been worsening in size as well as very tender to touch.  Patient states that his rash has been well-controlled but does have flares of this.  Insomnia does not believe has been fully controlled on trazodone.  Continues to be drinking but decreasing his intake  Review of Systems   Constitutional:  Negative for activity change, appetite change, chills, fatigue and fever.   HENT:  Negative for congestion.    Respiratory:  Negative for cough, chest tightness and shortness of breath.    Cardiovascular:  Negative for chest pain and leg swelling.   Gastrointestinal:  Positive for abdominal pain. Negative for abdominal distention, constipation, diarrhea, nausea and vomiting.   Skin:  Positive for rash.   All other systems reviewed and are negative.      Historical  Information   The patient history was reviewed as follows:  Past Medical History:   Diagnosis Date   • Anxiety    • Depression    • 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     Onset date: 7/30/2010    • Eczema    • Lyme disease     Onset date: 7/5/2011    • Uncomplicated alcohol abuse     Resolved 9/24/2014          Medications:     Current Outpatient Medications:   •  lisinopril (ZESTRIL) 2.5 mg tablet, take 1 tablet by mouth once daily, Disp: 90 tablet, Rfl: 1  •  traZODone (DESYREL) 100 mg tablet, Take 1 tablet (100 mg total) by mouth daily at bedtime, Disp: 90 tablet, Rfl: 1  •  colchicine (COLCRYS) 0.6 mg tablet, Take 2 tablets now, then 1 tablet an hour from then, then 1 tablet daily for 4 days (Patient not taking: Reported on 7/9/2024), Disp: 7 tablet, Rfl: 0  •  cyclobenzaprine (FLEXERIL) 10 mg tablet, Take 1 tablet (10 mg total) by mouth 3 (three) times a day as needed for muscle spasms (Patient not taking: Reported on 7/9/2024), Disp: 12 tablet, Rfl: 0  •  folic acid (FOLVITE) 1 mg tablet, Take 1 tablet (1 mg total) by mouth daily (Patient not taking: Reported on 6/8/2024), Disp: 90 tablet, Rfl: 1  •  hydrocortisone (WESTCORT) 0.2 % cream, Apply topically 2 (two) times a day (Patient not taking: Reported on 7/9/2024), Disp: 60 g, Rfl: 3  •  lidocaine (Lidoderm) 5 %, Apply 1 patch topically over 12 hours daily Remove & Discard patch within 12 hours or as directed by MD (Patient not taking: Reported on 7/9/2024), Disp: 30 patch, Rfl: 0  •  meloxicam (Mobic) 15 mg tablet, Take 1 tablet (15 mg total) by mouth daily (Patient not taking: Reported on 7/9/2024), Disp: 30 tablet, Rfl: 0  •  Thiamine Mononitrate (VITAMIN B1) 100 mg tablet, Take 1 tablet (100 mg total) by mouth daily (Patient not taking: Reported on 6/8/2024), Disp: 90 tablet, Rfl: 1    No Known Allergies    OBJECTIVE  Vitals:   Vitals:    07/25/24 1605   BP: 116/80   BP Location: Left arm   Patient Position: Sitting   Cuff Size: Large   Pulse: 88   Resp: 18  "  Temp: 98.6 °F (37 °C)   TempSrc: Temporal   SpO2: 97%   Weight: 96.9 kg (213 lb 9.6 oz)   Height: 5' 9.5\" (1.765 m)         Physical Exam  Constitutional:       Appearance: Normal appearance.   Pulmonary:      Effort: Pulmonary effort is normal.   Abdominal:      Hernia: A hernia (umbilical easily reduciable) is present.   Musculoskeletal:         General: Normal range of motion.   Neurological:      General: No focal deficit present.      Mental Status: He is alert and oriented to person, place, and time.   Psychiatric:         Mood and Affect: Mood normal.         Behavior: Behavior normal.         Thought Content: Thought content normal.         Judgment: Judgment normal.                    Jennifer Veliz MD,   [facility]  7/30/2024      "

## 2024-08-05 ENCOUNTER — CONSULT (OUTPATIENT)
Dept: SURGERY | Facility: CLINIC | Age: 39
End: 2024-08-05
Payer: COMMERCIAL

## 2024-08-05 VITALS
HEIGHT: 70 IN | TEMPERATURE: 97.5 F | HEART RATE: 72 BPM | BODY MASS INDEX: 30.18 KG/M2 | DIASTOLIC BLOOD PRESSURE: 80 MMHG | SYSTOLIC BLOOD PRESSURE: 128 MMHG | OXYGEN SATURATION: 97 % | WEIGHT: 210.8 LBS

## 2024-08-05 DIAGNOSIS — K42.9 UMBILICAL HERNIA WITHOUT OBSTRUCTION AND WITHOUT GANGRENE: Primary | ICD-10-CM

## 2024-08-05 DIAGNOSIS — F10.20 ALCOHOL DEPENDENCE, CONTINUOUS (HCC): ICD-10-CM

## 2024-08-05 PROCEDURE — 99214 OFFICE O/P EST MOD 30 MIN: CPT | Performed by: SURGERY

## 2024-08-05 RX ORDER — CEFAZOLIN SODIUM 2 G/50ML
2000 SOLUTION INTRAVENOUS ONCE
OUTPATIENT
Start: 2024-08-05 | End: 2024-08-05

## 2024-08-05 NOTE — ASSESSMENT & PLAN NOTE
Patient was educated about the procedure and counseled about the risks and benefits of the procedure, and its alternatives. Patient consented to the procedure.

## 2024-08-05 NOTE — ASSESSMENT & PLAN NOTE
Drinks 10 shots a day. Counseled on not drinking any liquids especially alcohol from midnight before the surgery day. Patient was counseled on the risks of alcohol abuse and the benefits of quitting.

## 2024-08-05 NOTE — PROGRESS NOTES
Ambulatory Visit  Name: Obed Bae      : 1985      MRN: [id number]  Encounter Provider: Viet Auguste MD  Encounter Date: 2024   Encounter department: [facility]    Assessment & Plan   1. Umbilical hernia without obstruction and without gangrene  Assessment & Plan:  Patient was educated about the procedure and counseled about the risks and benefits of the procedure, and its alternatives. Patient consented to the procedure.   Orders:  -     Ambulatory Referral to General Surgery  2. Alcohol dependence, continuous (HCC)  Assessment & Plan:  Drinks 10 shots a day. Counseled on not drinking any liquids especially alcohol from midnight before the surgery day. Patient was counseled on the risks of alcohol abuse and the benefits of quitting.       History of Present Illness     Obed Bae is a 39 y.o. male w/ PMH of HTN and chronic alcohol abuse who presents for a consult for his umbilical hernia without obstruction or gangrene. Patient was here for the same complaint in 2019 but declined to do the surgery. He lost ~ 30 lbs of weight and noticed that the umbilical defect enlarged. Patient states that a few months ago, he started experiencing pain when lifting, and sometimes at rest.   Patient admits appendectomy as a child; h/o alcohol abuse (10 shots x day)   Patient denies n/v/d/c     Review of Systems   Constitutional:  Negative for chills and fever.   HENT:  Negative for ear pain.    Eyes:  Negative for photophobia and pain.   Respiratory:  Negative for shortness of breath.    Cardiovascular:  Negative for chest pain.   Gastrointestinal:  Negative for diarrhea, nausea and vomiting.   Genitourinary:  Negative for difficulty urinating.   Musculoskeletal:  Negative for back pain and neck pain.   Neurological:  Negative for headaches.   Psychiatric/Behavioral:  Negative for hallucinations.        Objective     /80 (BP Location: Left arm, Patient Position: Sitting, Cuff  "Size: Large)   Pulse 72   Temp 97.5 °F (36.4 °C) (Core)   Ht 5' 9.5\" (1.765 m)   Wt 95.6 kg (210 lb 12.8 oz)   SpO2 97%   BMI 30.68 kg/m²     Physical Exam  Constitutional:       Appearance: Normal appearance.   HENT:      Head: Normocephalic and atraumatic.      Nose: Nose normal.      Mouth/Throat:      Mouth: Mucous membranes are moist.      Pharynx: Oropharynx is clear.   Eyes:      Conjunctiva/sclera: Conjunctivae normal.      Pupils: Pupils are equal, round, and reactive to light.   Cardiovascular:      Rate and Rhythm: Normal rate and regular rhythm.      Pulses: Normal pulses.      Heart sounds: Normal heart sounds.   Pulmonary:      Effort: Pulmonary effort is normal. No respiratory distress.      Breath sounds: Normal breath sounds.   Abdominal:      General: Abdomen is flat. Bowel sounds are normal.      Palpations: Abdomen is soft.      Tenderness: There is abdominal tenderness (periumbilical area). There is no guarding or rebound.      Hernia: A hernia (umbilical) is present.   Musculoskeletal:         General: Normal range of motion.      Cervical back: Normal range of motion and neck supple.      Right lower leg: No edema.      Left lower leg: No edema.   Skin:     General: Skin is warm and dry.   Neurological:      General: No focal deficit present.      Mental Status: He is alert and oriented to person, place, and time. Mental status is at baseline.   Psychiatric:         Mood and Affect: Mood normal.         Behavior: Behavior normal.         Thought Content: Thought content normal.       Administrative Statements           "

## 2024-08-12 ENCOUNTER — TELEPHONE (OUTPATIENT)
Age: 39
End: 2024-08-12

## 2024-08-27 ENCOUNTER — ANESTHESIA EVENT (OUTPATIENT)
Dept: PERIOP | Facility: HOSPITAL | Age: 39
End: 2024-08-27
Payer: COMMERCIAL

## 2024-08-27 NOTE — PRE-PROCEDURE INSTRUCTIONS
Pre-Surgery Instructions:   Medication Instructions    hydrocortisone (WESTCORT) 0.2 % cream Stop taking 1 day prior to surgery.    lisinopril (ZESTRIL) 2.5 mg tablet Hold day of surgery.    traZODone (DESYREL) 100 mg tablet Hold day of surgery.    Medication instructions for day surgery reviewed. Please use only a sip of water to take your instructed medications. Avoid all over the counter vitamins, supplements and NSAIDS for one week prior to surgery per anesthesia guidelines. Tylenol is ok to take as needed.     You will receive a call one business day prior to surgery with an arrival time and hospital directions. If your surgery is scheduled on a Monday, the hospital will be calling you on the Friday prior to your surgery. If you have not heard from anyone by 8pm, please call the hospital supervisor through the hospital  at [phone]. ([place] [phone] or [place] [phone]).    Do not eat or drink anything after midnight the night before your surgery, including candy, mints, lifesavers, or chewing gum. Do not drink alcohol 24hrs before your surgery. Try not to smoke at least 24hrs before your surgery.       Follow the pre surgery showering instructions as listed in the “My Surgical Experience Booklet” or otherwise provided by your surgeon's office. Do not use a blade to shave the surgical area 1 week before surgery. It is okay to use a clean electric clippers up to 24 hours before surgery. Do not apply any lotions, creams, including makeup, cologne, deodorant, or perfumes after showering on the day of your surgery. Do not use dry shampoo, hair spray, hair gel, or any type of hair products.     No contact lenses, eye make-up, or artificial eyelashes. Remove nail polish, including gel polish, and any artificial, gel, or acrylic nails if possible. Remove all jewelry including rings and body piercing jewelry.     Wear causal clothing that is easy to take on and off. Consider your type of  surgery.    Keep any valuables, jewelry, piercings at home. Please bring any specially ordered equipment (sling, braces) if indicated.    Arrange for a responsible person to drive you to and from the hospital on the day of your surgery. Please confirm the visitor policy for the day of your procedure when you receive your phone call with an arrival time.     Call the surgeon's office with any new illnesses, exposures, or additional questions prior to surgery.    Please reference your “My Surgical Experience Booklet” for additional information to prepare for your upcoming surgery.

## 2024-09-06 ENCOUNTER — HOSPITAL ENCOUNTER (OUTPATIENT)
Facility: HOSPITAL | Age: 39
Setting detail: OUTPATIENT SURGERY
Discharge: HOME | End: 2024-09-06
Attending: SURGERY | Admitting: SURGERY
Payer: COMMERCIAL

## 2024-09-06 ENCOUNTER — ANESTHESIA (OUTPATIENT)
Dept: PERIOP | Facility: HOSPITAL | Age: 39
End: 2024-09-06
Payer: COMMERCIAL

## 2024-09-06 VITALS
OXYGEN SATURATION: 100 % | HEART RATE: 71 BPM | WEIGHT: 189.38 LBS | HEIGHT: 69 IN | DIASTOLIC BLOOD PRESSURE: 91 MMHG | SYSTOLIC BLOOD PRESSURE: 155 MMHG | BODY MASS INDEX: 28.05 KG/M2 | RESPIRATION RATE: 16 BRPM | TEMPERATURE: 97.1 F

## 2024-09-06 DIAGNOSIS — K42.9 UMBILICAL HERNIA WITHOUT OBSTRUCTION AND WITHOUT GANGRENE: Primary | ICD-10-CM

## 2024-09-06 PROCEDURE — 49591 RPR AA HRN 1ST < 3 CM RDC: CPT | Performed by: PHYSICIAN ASSISTANT

## 2024-09-06 PROCEDURE — 88302 TISSUE EXAM BY PATHOLOGIST: CPT | Performed by: PATHOLOGY

## 2024-09-06 PROCEDURE — NC001 PR NO CHARGE: Performed by: SURGERY

## 2024-09-06 PROCEDURE — 49591 RPR AA HRN 1ST < 3 CM RDC: CPT | Performed by: SURGERY

## 2024-09-06 RX ORDER — DEXAMETHASONE SODIUM PHOSPHATE 10 MG/ML
INJECTION, SOLUTION INTRAMUSCULAR; INTRAVENOUS AS NEEDED
Status: DISCONTINUED | OUTPATIENT
Start: 2024-09-06 | End: 2024-09-06

## 2024-09-06 RX ORDER — SODIUM CHLORIDE, SODIUM LACTATE, POTASSIUM CHLORIDE, CALCIUM CHLORIDE 600; 310; 30; 20 MG/100ML; MG/100ML; MG/100ML; MG/100ML
INJECTION, SOLUTION INTRAVENOUS CONTINUOUS PRN
Status: DISCONTINUED | OUTPATIENT
Start: 2024-09-06 | End: 2024-09-06

## 2024-09-06 RX ORDER — FENTANYL CITRATE 50 UG/ML
INJECTION, SOLUTION INTRAMUSCULAR; INTRAVENOUS AS NEEDED
Status: DISCONTINUED | OUTPATIENT
Start: 2024-09-06 | End: 2024-09-06

## 2024-09-06 RX ORDER — LIDOCAINE HYDROCHLORIDE 10 MG/ML
INJECTION, SOLUTION EPIDURAL; INFILTRATION; INTRACAUDAL; PERINEURAL AS NEEDED
Status: DISCONTINUED | OUTPATIENT
Start: 2024-09-06 | End: 2024-09-06

## 2024-09-06 RX ORDER — FENTANYL CITRATE/PF 50 MCG/ML
25 SYRINGE (ML) INJECTION
Status: DISCONTINUED | OUTPATIENT
Start: 2024-09-06 | End: 2024-09-06 | Stop reason: HOSPADM

## 2024-09-06 RX ORDER — PROPOFOL 10 MG/ML
INJECTION, EMULSION INTRAVENOUS AS NEEDED
Status: DISCONTINUED | OUTPATIENT
Start: 2024-09-06 | End: 2024-09-06

## 2024-09-06 RX ORDER — ONDANSETRON 2 MG/ML
INJECTION INTRAMUSCULAR; INTRAVENOUS AS NEEDED
Status: DISCONTINUED | OUTPATIENT
Start: 2024-09-06 | End: 2024-09-06

## 2024-09-06 RX ORDER — BUPIVACAINE HYDROCHLORIDE AND EPINEPHRINE 5; 5 MG/ML; UG/ML
INJECTION, SOLUTION PERINEURAL AS NEEDED
Status: DISCONTINUED | OUTPATIENT
Start: 2024-09-06 | End: 2024-09-06 | Stop reason: HOSPADM

## 2024-09-06 RX ORDER — MIDAZOLAM HYDROCHLORIDE 2 MG/2ML
INJECTION, SOLUTION INTRAMUSCULAR; INTRAVENOUS AS NEEDED
Status: DISCONTINUED | OUTPATIENT
Start: 2024-09-06 | End: 2024-09-06

## 2024-09-06 RX ORDER — MAGNESIUM HYDROXIDE 1200 MG/15ML
LIQUID ORAL AS NEEDED
Status: DISCONTINUED | OUTPATIENT
Start: 2024-09-06 | End: 2024-09-06 | Stop reason: HOSPADM

## 2024-09-06 RX ORDER — OXYCODONE HYDROCHLORIDE 5 MG/1
5 TABLET ORAL EVERY 6 HOURS PRN
Qty: 8 TABLET | Refills: 0 | Status: SHIPPED | OUTPATIENT
Start: 2024-09-06

## 2024-09-06 RX ORDER — KETOROLAC TROMETHAMINE 30 MG/ML
INJECTION, SOLUTION INTRAMUSCULAR; INTRAVENOUS AS NEEDED
Status: DISCONTINUED | OUTPATIENT
Start: 2024-09-06 | End: 2024-09-06

## 2024-09-06 RX ORDER — CEFAZOLIN SODIUM 2 G/50ML
2000 SOLUTION INTRAVENOUS ONCE
Status: COMPLETED | OUTPATIENT
Start: 2024-09-06 | End: 2024-09-06

## 2024-09-06 RX ADMIN — SODIUM CHLORIDE, SODIUM LACTATE, POTASSIUM CHLORIDE, AND CALCIUM CHLORIDE: .6; .31; .03; .02 INJECTION, SOLUTION INTRAVENOUS at 10:04

## 2024-09-06 RX ADMIN — FENTANYL CITRATE 50 MCG: 50 INJECTION, SOLUTION INTRAMUSCULAR; INTRAVENOUS at 11:52

## 2024-09-06 RX ADMIN — PROPOFOL 100 MG: 10 INJECTION, EMULSION INTRAVENOUS at 12:13

## 2024-09-06 RX ADMIN — FENTANYL CITRATE 50 MCG: 50 INJECTION, SOLUTION INTRAMUSCULAR; INTRAVENOUS at 11:59

## 2024-09-06 RX ADMIN — KETOROLAC TROMETHAMINE 30 MG: 30 INJECTION, SOLUTION INTRAMUSCULAR at 11:57

## 2024-09-06 RX ADMIN — CEFAZOLIN SODIUM 2000 MG: 2 SOLUTION INTRAVENOUS at 11:51

## 2024-09-06 RX ADMIN — LIDOCAINE HYDROCHLORIDE 50 MG: 10 INJECTION, SOLUTION EPIDURAL; INFILTRATION; INTRACAUDAL; PERINEURAL at 11:48

## 2024-09-06 RX ADMIN — PROPOFOL 200 MG: 10 INJECTION, EMULSION INTRAVENOUS at 11:48

## 2024-09-06 RX ADMIN — MIDAZOLAM 2 MG: 1 INJECTION INTRAMUSCULAR; INTRAVENOUS at 11:40

## 2024-09-06 RX ADMIN — ONDANSETRON 4 MG: 2 INJECTION INTRAMUSCULAR; INTRAVENOUS at 11:56

## 2024-09-06 RX ADMIN — DEXAMETHASONE SODIUM PHOSPHATE 10 MG: 10 INJECTION, SOLUTION INTRAMUSCULAR; INTRAVENOUS at 11:56

## 2024-09-06 RX ADMIN — PROPOFOL 100 MG: 10 INJECTION, EMULSION INTRAVENOUS at 11:54

## 2024-09-06 NOTE — PERIOPERATIVE NURSING NOTE
AVS reviewed with patient. All questions answered and all concerns addressed. IV access removed. Patient discharged via wheelchair with all belongings.

## 2024-09-06 NOTE — PERIOPERATIVE NURSING NOTE
Abdominal incision dry, intact.  Ice pack to site. Given water and crackers. Call bell in  reach, pt aware of need to void prior to discharge

## 2024-09-06 NOTE — H&P
Ambulatory Visit  Name: Obed Bae      : 1985      MRN: [id number]  Encounter Provider: Viet Auguste MD  Encounter Date: 2024   Encounter department: [facility]    Assessment & Plan  1. Umbilical hernia without obstruction and without gangrene  Assessment & Plan:  Patient was educated about the procedure and counseled about the risks and benefits of the procedure, and its alternatives. Patient consented to the procedure.   Orders:  -     Ambulatory Referral to General Surgery  2. Alcohol dependence, continuous (HCC)  Assessment & Plan:  Drinks 10 shots a day. Counseled on not drinking any liquids especially alcohol from midnight before the surgery day. Patient was counseled on the risks of alcohol abuse and the benefits of quitting.       History of Present Illness    Obed Bae is a 39 y.o. male w/ PMH of HTN and chronic alcohol abuse who presents for a consult for his umbilical hernia without obstruction or gangrene. Patient was here for the same complaint in 2019 but declined to do the surgery. He lost ~ 30 lbs of weight and noticed that the umbilical defect enlarged. Patient states that a few months ago, he started experiencing pain when lifting, and sometimes at rest.   Patient admits appendectomy as a child; h/o alcohol abuse (10 shots x day)   Patient denies n/v/d/c     Review of Systems   Constitutional:  Negative for chills and fever.   HENT:  Negative for ear pain.    Eyes:  Negative for photophobia and pain.   Respiratory:  Negative for shortness of breath.    Cardiovascular:  Negative for chest pain.   Gastrointestinal:  Negative for diarrhea, nausea and vomiting.   Genitourinary:  Negative for difficulty urinating.   Musculoskeletal:  Negative for back pain and neck pain.   Neurological:  Negative for headaches.   Psychiatric/Behavioral:  Negative for hallucinations.        Objective    /80 (BP Location: Left arm, Patient Position: Sitting, Cuff  "Size: Large)   Pulse 72   Temp 97.5 °F (36.4 °C) (Core)   Ht 5' 9.5\" (1.765 m)   Wt 95.6 kg (210 lb 12.8 oz)   SpO2 97%   BMI 30.68 kg/m²     Physical Exam  Constitutional:       Appearance: Normal appearance.   HENT:      Head: Normocephalic and atraumatic.      Nose: Nose normal.      Mouth/Throat:      Mouth: Mucous membranes are moist.      Pharynx: Oropharynx is clear.   Eyes:      Conjunctiva/sclera: Conjunctivae normal.      Pupils: Pupils are equal, round, and reactive to light.   Cardiovascular:      Rate and Rhythm: Normal rate and regular rhythm.      Pulses: Normal pulses.      Heart sounds: Normal heart sounds.   Pulmonary:      Effort: Pulmonary effort is normal. No respiratory distress.      Breath sounds: Normal breath sounds.   Abdominal:      General: Abdomen is flat. Bowel sounds are normal.      Palpations: Abdomen is soft.      Tenderness: There is abdominal tenderness (periumbilical area). There is no guarding or rebound.      Hernia: A hernia (umbilical) is present.   Musculoskeletal:         General: Normal range of motion.      Cervical back: Normal range of motion and neck supple.      Right lower leg: No edema.      Left lower leg: No edema.   Skin:     General: Skin is warm and dry.   Neurological:      General: No focal deficit present.      Mental Status: He is alert and oriented to person, place, and time. Mental status is at baseline.   Psychiatric:         Mood and Affect: Mood normal.         Behavior: Behavior normal.         Thought Content: Thought content normal.       Administrative Statements            "

## 2024-09-06 NOTE — OP NOTE
OPERATIVE REPORT  PATIENT NAME: Obed Bae    :  1985  MRN: [id number]  Pt Location: [facility] OR ROOM 01    SURGERY DATE: 2024    Surgeons and Role:     * Viet Auguste MD - Primary     * Ivan Forte PA-C - Assisting    Preop Diagnosis:  Umbilical hernia without obstruction and without gangrene [K42.9]    Post-Op Diagnosis Codes:     * Umbilical hernia without obstruction and without gangrene [K42.9]    Procedure(s):  REPAIR HERNIA UMBILICAL    Specimen(s):  ID Type Source Tests Collected by Time Destination   1 : Umbilical Hernia Sac & Omentum Tissue Hernia Sac, Umbilical TISSUE EXAM Viet Auguste MD 2024 1217        Estimated Blood Loss:   Minimal    Drains:  * No LDAs found *    Anesthesia Type:   General    Operative Indications:  Umbilical hernia without obstruction and without gangrene [K42.9]  Prior to skin incision, fascial opening at the umbilicus was documented to be 2.5 cm wide by 2.0 cm long    Operative Findings:  Umbilical hernia      Prior to opening the fascia, or reducing the contents of the hernia, the hernia defect was measured. The defect measured 2.5 cm by 2.0 cm. This was repaired as described in the body of the report below.      Complications:   None    Procedure and Technique:  Umbilical hernia repair.    Patient was taken back to main operating room, placed supine on the operating table, general anesthesia was induced, the abdomen was prepped and draped in normal fashion.    Utilizing a curvilinear infraumbilical incision, skin was incised.  Soft tissue was divided with Bovie cautery.  The dermis of the umbilicus was dissected free from the hernia sac and the hernia sac was opened.  It was noted to contain a large amount of incarcerated omentum.  The base of the omentum was ligated with 2-0 Vicryl suture and the distal portion was transected and sent off the field as a specimen.  The fascial opening at the umbilicus was documented to be 2.5 cm x 2.0 cm.  This was  closed primarily with interrupted 0 Ethibond sutures.  There is irrigated and soft tissue was approximated 3-0 Vicryl as well as the dermis.  The dermis of the umbilicus was tacked down to the fascial repair.  The skin skin incision was reapproximated with 4-0 Monocryl.  Exofin was placed as a dressing.    Patient awoke from anesthesia, extubated operating, sent to the PACU in stable condition.    JER GROVE was required for technical assistance and retraction.   I was present for the entire procedure. and A qualified resident physician was not available.    Patient Disposition:  PACU              SIGNATURE: Viet Auguste MD  DATE: September 6, 2024  TIME: 2:32 PM

## 2024-09-06 NOTE — INTERVAL H&P NOTE
H&P reviewed. After examining the patient I find no changes in the patients condition since the H&P had been written.    Vitals:    09/06/24 1004   BP: 142/96   Pulse: 80   Resp: 18   Temp: 98.5 °F (36.9 °C)   SpO2: 98%

## 2024-09-06 NOTE — ANESTHESIA PREPROCEDURE EVALUATION
Procedure:  REPAIR HERNIA UMBILICAL (Abdomen)    Relevant Problems   NEURO/PSYCH   (+) Depression with anxiety   (+) PTSD (post-traumatic stress disorder)   (+) Recurrent major depressive disorder, in partial remission (HCC)        Physical Exam    Airway    Mallampati score: II  TM Distance: >3 FB  Neck ROM: full     Dental   No notable dental hx     Cardiovascular  Cardiovascular exam normal    Pulmonary  Pulmonary exam normal     Other Findings        Anesthesia Plan  ASA Score- 3     Anesthesia Type- general with ASA Monitors.         Additional Monitors:     Airway Plan: LMA.           Plan Factors-Exercise tolerance (METS): >4 METS.    Chart reviewed.   Existing labs reviewed. Patient summary reviewed.    Patient is a current smoker.  Patient smoked on day of surgery.    Obstructive sleep apnea risk education given perioperatively.        Induction- intravenous.    Postoperative Plan- Plan for postoperative opioid use.     Perioperative Resuscitation Plan - Level 1 - Full Code.       Informed Consent- Anesthetic plan and risks discussed with patient.  I personally reviewed this patient with the CRNA. Discussed and agreed on the Anesthesia Plan with the CRNA..

## 2024-09-06 NOTE — DISCHARGE INSTR - AVS FIRST PAGE
1.  Keep incision clean and dry for 2 days.  After 2 days, it may get wet in the shower.  No dressings are required.  2.  Take the Aleve that you have at home, 1 pill 3 times a day.  You may also use a cold pack over the incision as needed.  3.  Take the Roxicodone, in addition to the Aleve, if you need further pain control.  4.  If you do not already have an appointment, call my office at [phone] for an appointment to be seen in about 10 to 14 days.  
well appearing

## 2024-09-12 PROCEDURE — 88302 TISSUE EXAM BY PATHOLOGIST: CPT | Performed by: PATHOLOGY

## 2024-09-19 ENCOUNTER — OFFICE VISIT (OUTPATIENT)
Dept: SURGERY | Facility: CLINIC | Age: 39
End: 2024-09-19

## 2024-09-19 VITALS — WEIGHT: 189.4 LBS | BODY MASS INDEX: 27.11 KG/M2 | HEIGHT: 70 IN | TEMPERATURE: 97.6 F

## 2024-09-19 DIAGNOSIS — L03.90 CELLULITIS: ICD-10-CM

## 2024-09-19 DIAGNOSIS — Z09 SURGERY FOLLOW-UP EXAMINATION: Primary | ICD-10-CM

## 2024-09-19 PROCEDURE — 99024 POSTOP FOLLOW-UP VISIT: CPT | Performed by: SURGERY

## 2024-09-19 RX ORDER — SULFAMETHOXAZOLE/TRIMETHOPRIM 800-160 MG
1 TABLET ORAL EVERY 12 HOURS SCHEDULED
Qty: 14 TABLET | Refills: 0 | Status: SHIPPED | OUTPATIENT
Start: 2024-09-19 | End: 2024-09-26

## 2024-09-19 NOTE — PROGRESS NOTES
Patient is status post umbilical hernia repair 6 September 2024.  He is here for routine follow-up.  He reports minimal pain and no wound issues.    Pathology report:  Final Diagnosis   A. Hernia Sac, Umbilical, Umbilical Hernia Sac & Omentum:  Congested fibromembranous / fibroadipose tissue with nodular coagulative fat necrosis and calcification      Small amount of epidermal necrolysis.  Little more erythema and induration than I would expect.  No fluctuance.  No purulent discharge    Bactrim 1 p.o. twice daily x 7 days  Clean daily with soap and water in the shower and leave open to air  Follow-up in 7 to 10 days for wound check.

## 2024-09-30 ENCOUNTER — OFFICE VISIT (OUTPATIENT)
Dept: SURGERY | Facility: CLINIC | Age: 39
End: 2024-09-30
Payer: COMMERCIAL

## 2024-09-30 VITALS — BODY MASS INDEX: 26.71 KG/M2 | HEIGHT: 70 IN | WEIGHT: 186.6 LBS | TEMPERATURE: 97.7 F

## 2024-09-30 DIAGNOSIS — Z09 SURGERY FOLLOW-UP EXAMINATION: Primary | ICD-10-CM

## 2024-09-30 PROCEDURE — 99212 OFFICE O/P EST SF 10 MIN: CPT | Performed by: SURGERY

## 2024-09-30 NOTE — PROGRESS NOTES
Status post umbilical hernia repair 6 September 2024.  He is here for routine follow-up.  He reports minimal pain and no wound issues.    Pathology report:  Final Diagnosis   A. Hernia Sac, Umbilical, Umbilical Hernia Sac & Omentum:  Congested fibromembranous / fibroadipose tissue with nodular coagulative fat necrosis and calcification         Small scab at the site of his incision, but no signs of infection  Patient counseled.  Follow-up as needed.

## 2024-11-24 DIAGNOSIS — I10 ESSENTIAL HYPERTENSION: ICD-10-CM

## 2024-11-25 ENCOUNTER — OFFICE VISIT (OUTPATIENT)
Dept: FAMILY MEDICINE CLINIC | Facility: CLINIC | Age: 39
End: 2024-11-25
Payer: COMMERCIAL

## 2024-11-25 ENCOUNTER — NURSE TRIAGE (OUTPATIENT)
Age: 39
End: 2024-11-25

## 2024-11-25 VITALS
TEMPERATURE: 98 F | DIASTOLIC BLOOD PRESSURE: 80 MMHG | WEIGHT: 174 LBS | HEIGHT: 68 IN | HEART RATE: 78 BPM | OXYGEN SATURATION: 97 % | BODY MASS INDEX: 26.37 KG/M2 | RESPIRATION RATE: 16 BRPM | SYSTOLIC BLOOD PRESSURE: 120 MMHG

## 2024-11-25 DIAGNOSIS — K42.9 UMBILICAL HERNIA WITHOUT OBSTRUCTION AND WITHOUT GANGRENE: ICD-10-CM

## 2024-11-25 DIAGNOSIS — F41.8 DEPRESSION WITH ANXIETY: ICD-10-CM

## 2024-11-25 DIAGNOSIS — K40.90 LEFT INGUINAL HERNIA: Primary | ICD-10-CM

## 2024-11-25 PROCEDURE — 99214 OFFICE O/P EST MOD 30 MIN: CPT | Performed by: STUDENT IN AN ORGANIZED HEALTH CARE EDUCATION/TRAINING PROGRAM

## 2024-11-25 RX ORDER — LISINOPRIL 2.5 MG/1
2.5 TABLET ORAL DAILY
Qty: 90 TABLET | Refills: 1 | Status: SHIPPED | OUTPATIENT
Start: 2024-11-25

## 2024-11-25 NOTE — PROGRESS NOTES
Clinic Visit Note  Obed Bae 39 y.o. male   MRN: [id number]    Assessment and Plan      Diagnoses and all orders for this visit:    Left inguinal hernia  -     US groin/inguinal area; Future  -     Ambulatory Referral to General Surgery; Future    Depression with anxiety    Umbilical hernia without obstruction and without gangrene      Concern for new reproducible left inguinal hernia, ultrasound imaging to confirm, general surgery evaluation recommended, recent umbilical hernia previously fixed with general surgery team.    Precautions reviewed, anti-inflammatory relief if needed.    My impressions and treatment recommendations were discussed in detail with the patient who verbalized understanding and had no further questions.  Discharge instructions were provided.    Subjective     Chief Complaint: F/U    History of Present Illness:    Concern for left groin pain.    The following portions of the patient's history were reviewed and updated as appropriate: allergies, current medications, past family history, past medical history, past social history, past surgical history and problem list.    REVIEW OF SYSTEMS:  A complete 12-point review of systems is negative other than that noted in the HPI.    Review of Systems   Constitutional:  Negative for chills, fatigue and fever.   HENT:  Negative for congestion and sore throat.    Eyes:  Negative for pain and visual disturbance.   Respiratory:  Negative for shortness of breath and wheezing.    Cardiovascular:  Negative for chest pain and palpitations.   Gastrointestinal:  Positive for abdominal pain. Negative for constipation, diarrhea, nausea and vomiting.   Genitourinary:  Negative for dysuria and frequency.   Musculoskeletal:  Negative for back pain and neck pain.   Skin:  Negative for color change and rash.   Neurological:  Negative for dizziness and headaches.   Psychiatric/Behavioral:  Negative for agitation and confusion.    All other systems reviewed and  are negative.        Current Outpatient Medications:     hydrocortisone (WESTCORT) 0.2 % cream, Apply topically 2 (two) times a day, Disp: 60 g, Rfl: 3    lisinopril (ZESTRIL) 2.5 mg tablet, take 1 tablet by mouth once daily, Disp: 90 tablet, Rfl: 1    traZODone (DESYREL) 100 mg tablet, Take 1 tablet (100 mg total) by mouth daily at bedtime, Disp: 90 tablet, Rfl: 1    oxyCODONE (Roxicodone) 5 immediate release tablet, Take 1 tablet (5 mg total) by mouth every 6 (six) hours as needed for severe pain or moderate pain for up to 8 doses Max Daily Amount: 20 mg, Disp: 8 tablet, Rfl: 0  Past Medical History:   Diagnosis Date    Anxiety     Depression     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     Onset date: 7/30/2010     Eczema     GERD (gastroesophageal reflux disease)     Hypertension     Lyme disease     Onset date: 7/5/2011     Uncomplicated alcohol abuse     Resolved 9/24/2014      Past Surgical History:   Procedure Laterality Date    APPENDECTOMY      [place] RPR AA HERNIA 1ST < 3 CM REDUCIBLE N/A 9/6/2024    Procedure: REPAIR HERNIA UMBILICAL;  Surgeon: Viet uAguste MD;  Location: [facility];  Service: General    SINUS SURGERY      TONSILLECTOMY       Social History     Socioeconomic History    Marital status: Single     Spouse name: Not on file    Number of children: Not on file    Years of education: Not on file    Highest education level: Not on file   Occupational History    Not on file   Tobacco Use    Smoking status: Every Day     Current packs/day: 1.00     Average packs/day: 1 pack/day for 10.0 years (10.0 ttl pk-yrs)     Types: Cigarettes     Passive exposure: Current    Smokeless tobacco: Never   Vaping Use    Vaping status: Never Used   Substance and Sexual Activity    Alcohol use: Yes     Alcohol/week: 12.0 standard drinks of alcohol     Types: 12 Shots of liquor per week    Drug use: Yes     Types: Marijuana    Sexual activity: Not on file   Other Topics Concern    Not on file   Social History Narrative     "History of cigarette smoker - As per [employer]    Former smoker - As per [employer]    Marijuana - As per [employer]      Social Drivers of Health     Financial Resource Strain: Not on file   Food Insecurity: Not on file   Transportation Needs: Not on file   Physical Activity: Not on file   Stress: Not on file   Social Connections: Not on file   Intimate Partner Violence: Not on file   Housing Stability: Not on file     History reviewed. No pertinent family history.  No Known Allergies    Objective     Vitals:    11/25/24 1336   BP: 120/80   BP Location: Left arm   Patient Position: Sitting   Cuff Size: Adult   Pulse: 78   Resp: 16   Temp: 98 °F (36.7 °C)   TempSrc: Temporal   SpO2: 97%   Weight: 78.9 kg (174 lb)   Height: 5' 8\" (1.727 m)       Physical Exam:     GENERAL: NAD, pleasant   HEENT:  NC/AT, PERRL, EOMI, no scleral icterus  CARDIAC:  RRR, +S1/S2, no S3/S4 appreciated, no m/g/r  PULMONARY:  CTA B/L, no wheezing/rales/rhonci, non-labored breathing  ABDOMEN:  Soft, NT/ND, no rebound/guarding/rigidity  Extremities:. No edema, cyanosis, or clubbing  Musculoskeletal:  Full range of motion intact in all extremities   NEUROLOGIC: Grossly intact, no focal deficits  SKIN:  No rashes or erythema noted on exposed skin  Psych: Normal affect, mood stable    ==  PLEASE NOTE:  This encounter was completed utilizing the [employer] Direct Speech Voice Recognition Software. Grammatical errors, random word insertions, pronoun errors and incomplete sentences are occasional consequences of the system due to software limitations, ambient noise and hardware issues.These may be missed by proof reading prior to affixing electronic signature. Any questions or concerns about the content, text or information contained within the body of this dictation should be directly addressed to the physician for clarification. Please do not hesitate to call me directly if you have any any questions or concerns.    DO Azar Rey. " [facility]   [facility]

## 2024-11-25 NOTE — TELEPHONE ENCOUNTER
"Patient scheduled for today by clerical. Appt appropriate  Reason for Disposition   Patient wants to be seen    Answer Assessment - Initial Assessment Questions  1. ONSET:  \"When did this first appear?\"      One week ago   2. APPEARANCE: \"What does it look like?\"      Small egg like lump  3. SIZE: \"How big is it?\" (inches, cm or compare to coins, fruit)      Egg size  4. LOCATION: \"Where exactly is the hernia located?\"      Left groin  5. PATTERN: \"Does the swelling come and go, or has it been constant since it started?\"      Can push back in per patient   6. PAIN: \"Is there any pain?\" If Yes, ask: \"How bad is it?\"  (Scale 1-10; or mild, moderate, severe)      mild  7. DIAGNOSIS: \"Have you been seen by a doctor (or NP/PA) for this?\" \"Did the doctor diagnose you as having a hernia?\"      No  8. OTHER SYMPTOMS: \"Do you have any other symptoms?\" (e.g., fever, abdomen pain, vomiting)      Denies   9. PREGNANCY: \"Is there any chance you are pregnant?\" \"When was your last menstrual period?\"      N/a    Protocols used: Hernia-Adult-[place]    "

## 2024-11-25 NOTE — TELEPHONE ENCOUNTER
Regarding: lump in groin area poss hernia  ----- Message from Sylvie CARROLL sent at 11/25/2024 11:09 AM EST -----  Patients mother called in stating for the past two weeks now he has had a lump in his groin area that is now causing pain. Patient is unsure if it's another hernia. Patient was not available for triage at time of call as mother called in for him stating he was at work. Patient was scheduled for today at 1:45pm. Advised his mother to let him know to expect a call from one of our nurses. Please advise. Thank you.

## 2024-12-03 ENCOUNTER — TELEPHONE (OUTPATIENT)
Age: 39
End: 2024-12-03

## 2024-12-03 NOTE — TELEPHONE ENCOUNTER
Patient called in to say he made an ultrasound appt for 12/7.  I rescheduled his OVL with Dr Auguste for 12/17

## 2024-12-07 ENCOUNTER — HOSPITAL ENCOUNTER (OUTPATIENT)
Dept: RADIOLOGY | Facility: HOSPITAL | Age: 39
Discharge: HOME | End: 2024-12-07
Attending: STUDENT IN AN ORGANIZED HEALTH CARE EDUCATION/TRAINING PROGRAM
Payer: COMMERCIAL

## 2024-12-07 DIAGNOSIS — K40.90 LEFT INGUINAL HERNIA: ICD-10-CM

## 2024-12-07 PROCEDURE — 76705 ECHO EXAM OF ABDOMEN: CPT

## 2024-12-12 ENCOUNTER — OFFICE VISIT (OUTPATIENT)
Dept: FAMILY MEDICINE CLINIC | Facility: CLINIC | Age: 39
End: 2024-12-12
Payer: COMMERCIAL

## 2024-12-12 ENCOUNTER — RESULTS FOLLOW-UP (OUTPATIENT)
Dept: FAMILY MEDICINE CLINIC | Facility: CLINIC | Age: 39
End: 2024-12-12

## 2024-12-12 VITALS
RESPIRATION RATE: 18 BRPM | DIASTOLIC BLOOD PRESSURE: 80 MMHG | OXYGEN SATURATION: 99 % | HEIGHT: 70 IN | TEMPERATURE: 98 F | SYSTOLIC BLOOD PRESSURE: 150 MMHG | WEIGHT: 171 LBS | BODY MASS INDEX: 24.48 KG/M2 | HEART RATE: 85 BPM

## 2024-12-12 DIAGNOSIS — G47.00 INSOMNIA, UNSPECIFIED TYPE: ICD-10-CM

## 2024-12-12 DIAGNOSIS — F43.21 SITUATIONAL DEPRESSION: Primary | ICD-10-CM

## 2024-12-12 DIAGNOSIS — K40.90 LEFT INGUINAL HERNIA: ICD-10-CM

## 2024-12-12 PROCEDURE — 99214 OFFICE O/P EST MOD 30 MIN: CPT | Performed by: FAMILY MEDICINE

## 2024-12-12 RX ORDER — ARIPIPRAZOLE 2 MG/1
2 TABLET ORAL
Qty: 30 TABLET | Refills: 0 | Status: SHIPPED | OUTPATIENT
Start: 2024-12-12

## 2024-12-12 RX ORDER — TRAZODONE HYDROCHLORIDE 100 MG/1
150 TABLET ORAL
Qty: 90 TABLET | Refills: 1 | Status: SHIPPED | OUTPATIENT
Start: 2024-12-12

## 2024-12-12 NOTE — PROGRESS NOTES
Obed Bae 1985 male MRN: [id number]    [facility] PRACTICE OFFICE VISIT  [facility]      ASSESSMENT/PLAN  Obed Bae is a 39 y.o. male presents to the office for    Diagnoses and all orders for this visit:    Situational depression  -     ARIPiprazole (ABILIFY) 2 mg tablet; Take 1 tablet (2 mg total) by mouth daily at bedtime    Left inguinal hernia    Insomnia, unspecified type  -     ARIPiprazole (ABILIFY) 2 mg tablet; Take 1 tablet (2 mg total) by mouth daily at bedtime  -     traZODone (DESYREL) 100 mg tablet; Take 1.5 tablets (150 mg total) by mouth daily at bedtime       Will see if patient would benefit possibly from a small dose of Abilify 2 mg at bedtime.  Patient is to notify me if he has any side effects.  Would like him to try this for at least 30 days.  Upcoming left inguinal hernia repair         Future Appointments   Date Time Provider Department Center   1/16/2025  4:30 PM Jennifer Mena MD [facility]          SUBJECTIVE  CC: sleeping issues      HPI:  Obed Bae is a 39 y.o. male who presents for an acute appointment.  Continues to be having sleeping issues and anxiety.  Has about 10 shots of alcohol around 5 PM daily in order to help him sleep.  States that the trazodone was helping but it seems like his anxiety and mood is not well.  Taking his blood pressure medications without any side effects  Review of Systems   Constitutional:  Negative for activity change, appetite change, chills, fatigue and fever.   HENT:  Negative for congestion.    Respiratory:  Negative for cough, chest tightness and shortness of breath.    Cardiovascular:  Negative for chest pain and leg swelling.   Gastrointestinal:  Negative for abdominal distention, abdominal pain, constipation, diarrhea, nausea and vomiting.   Psychiatric/Behavioral:  The patient is nervous/anxious.    All other systems reviewed and are negative.      Historical  "Information   The patient history was reviewed as follows:  Past Medical History:   Diagnosis Date   • Anxiety    • Depression    • 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     Onset date: 7/30/2010    • Eczema    • GERD (gastroesophageal reflux disease)    • Hypertension    • Lyme disease     Onset date: 7/5/2011    • Uncomplicated alcohol abuse     Resolved 9/24/2014          Medications:     Current Outpatient Medications:   •  ARIPiprazole (ABILIFY) 2 mg tablet, Take 1 tablet (2 mg total) by mouth daily at bedtime, Disp: 30 tablet, Rfl: 0  •  hydrocortisone (WESTCORT) 0.2 % cream, Apply topically 2 (two) times a day, Disp: 60 g, Rfl: 3  •  lisinopril (ZESTRIL) 2.5 mg tablet, take 1 tablet by mouth once daily, Disp: 90 tablet, Rfl: 1  •  traZODone (DESYREL) 100 mg tablet, Take 1.5 tablets (150 mg total) by mouth daily at bedtime, Disp: 90 tablet, Rfl: 1  •  oxyCODONE-acetaminophen (Percocet) 5-325 mg per tablet, Take 1 tablet by mouth every 6 (six) hours as needed for moderate pain for up to 8 doses Max Daily Amount: 4 tablets, Disp: 8 tablet, Rfl: 0    No Known Allergies    OBJECTIVE  Vitals:   Vitals:    12/12/24 1539   BP: 150/80   BP Location: Left arm   Patient Position: Sitting   Cuff Size: Standard   Pulse: 85   Resp: 18   Temp: 98 °F (36.7 °C)   TempSrc: Temporal   SpO2: 99%   Weight: 77.6 kg (171 lb)   Height: 5' 9.5\" (1.765 m)         Physical Exam  Vitals reviewed.   Constitutional:       Appearance: He is well-developed.   HENT:      Head: Normocephalic and atraumatic.   Eyes:      Conjunctiva/sclera: Conjunctivae normal.      Pupils: Pupils are equal, round, and reactive to light.   Cardiovascular:      Rate and Rhythm: Normal rate and regular rhythm.      Heart sounds: Normal heart sounds, S1 normal and S2 normal. No murmur heard.  Pulmonary:      Effort: Pulmonary effort is normal. No respiratory distress.      Breath sounds: Normal breath sounds. No wheezing.   Musculoskeletal:         General: Normal " range of motion.      Cervical back: Normal range of motion and neck supple.   Skin:     General: Skin is warm.   Neurological:      Mental Status: He is alert and oriented to person, place, and time.   Psychiatric:         Speech: Speech normal.         Behavior: Behavior normal.         Thought Content: Thought content normal.         Judgment: Judgment normal.                    Jennifer Mena MD,   [facility]  12/22/2024

## 2024-12-17 ENCOUNTER — TELEPHONE (OUTPATIENT)
Dept: SURGERY | Facility: CLINIC | Age: 39
End: 2024-12-17

## 2024-12-17 ENCOUNTER — TELEPHONE (OUTPATIENT)
Age: 39
End: 2024-12-17

## 2024-12-17 ENCOUNTER — OFFICE VISIT (OUTPATIENT)
Dept: SURGERY | Facility: CLINIC | Age: 39
End: 2024-12-17
Payer: COMMERCIAL

## 2024-12-17 VITALS
TEMPERATURE: 97.1 F | HEIGHT: 70 IN | SYSTOLIC BLOOD PRESSURE: 138 MMHG | OXYGEN SATURATION: 98 % | WEIGHT: 173.4 LBS | BODY MASS INDEX: 24.82 KG/M2 | HEART RATE: 82 BPM | DIASTOLIC BLOOD PRESSURE: 80 MMHG

## 2024-12-17 DIAGNOSIS — K40.90 LEFT INGUINAL HERNIA: ICD-10-CM

## 2024-12-17 PROCEDURE — 99214 OFFICE O/P EST MOD 30 MIN: CPT | Performed by: SURGERY

## 2024-12-17 NOTE — TELEPHONE ENCOUNTER
12/17/24  L/M FOR MURIEL TO CALL WOULD LIKE TO CHANGE APPT TIME FROM 3:30 TO 1:30.  ASKED HIM TO CALL BACK.

## 2024-12-17 NOTE — H&P
"[facility] History and Physical Note:    Assessment:  Left inguinal hernia, causing some discomfort.    Pertinent labs reviewed  CMP and CBC 28 June 2024 largely unremarkable  Pertinent images and available reads personally reviewed  Reviewed ultrasound images and report  Pertinent notes reviewed  Reviewed the last PCP note    Plan:  Left inguinal hernia repair    Chief Complaint:  \"I am here for the hernia on the left\"    HPI    Patient is a 39-year-old gentleman status post repair of a small umbilical hernia September 2024 and now with diagnosis of left inguinal hernia made this month by Dr. Weiss.    An ultrasound of the left groin was performed 7 December 2024:  FINDINGS:  Fat-containing left inguinal hernia. Hernia aperture measures 1.2 cm. No abnormal mass. No bowel within the hernia.  IMPRESSION:  Small fat-containing left inguinal hernia.      PMH:  Past Medical History:   Diagnosis Date    Anxiety     Depression     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     Onset date: 7/30/2010     Eczema     GERD (gastroesophageal reflux disease)     Hypertension     Lyme disease     Onset date: 7/5/2011     Uncomplicated alcohol abuse     Resolved 9/24/2014        PSH:  Past Surgical History:   Procedure Laterality Date    APPENDECTOMY      [place] RPR AA HERNIA 1ST < 3 CM REDUCIBLE N/A 9/6/2024    Procedure: REPAIR HERNIA UMBILICAL;  Surgeon: Viet Auugste MD;  [facility];  Service: General    SINUS SURGERY      TONSILLECTOMY         Home Meds:  Current Outpatient Medications on File Prior to Visit   Medication Sig Dispense Refill    ARIPiprazole (ABILIFY) 2 mg tablet Take 1 tablet (2 mg total) by mouth daily at bedtime 30 tablet 0    hydrocortisone (WESTCORT) 0.2 % cream Apply topically 2 (two) times a day 60 g 3    lisinopril (ZESTRIL) 2.5 mg tablet take 1 tablet by mouth once daily 90 tablet 1    traZODone (DESYREL) 100 mg tablet Take 1.5 tablets (150 mg total) by mouth daily at bedtime " 90 tablet 1     No current facility-administered medications on file prior to visit.       Allergies:  No Known Allergies    Social Hx:  Social History     Socioeconomic History    Marital status: Single     Spouse name: Not on file    Number of children: Not on file    Years of education: Not on file    Highest education level: Not on file   Occupational History    Not on file   Tobacco Use    Smoking status: Every Day     Current packs/day: 1.00     Average packs/day: 1 pack/day for 10.0 years (10.0 ttl pk-yrs)     Types: Cigarettes     Passive exposure: Current    Smokeless tobacco: Never   Vaping Use    Vaping status: Never Used   Substance and Sexual Activity    Alcohol use: Yes     Alcohol/week: 12.0 standard drinks of alcohol     Types: 12 Shots of liquor per week    Drug use: Yes     Types: Marijuana    Sexual activity: Not on file   Other Topics Concern    Not on file   Social History Narrative    History of cigarette smoker - As per [employer]    Former smoker - As per [employer]    Marijuana - As per [employer]      Social Drivers of Health     Financial Resource Strain: Not on file   Food Insecurity: Not on file   Transportation Needs: Not on file   Physical Activity: Not on file   Stress: Not on file   Social Connections: Not on file   Intimate Partner Violence: Not on file   Housing Stability: Not on file        Family Hx:    No family history on file.      Review of Systems   Constitutional:  Negative for chills and fever.   Respiratory: Negative.     Cardiovascular: Negative.    Genitourinary: Negative.    Neurological: Negative.    Hematological: Negative.    All other systems reviewed and are negative.      There were no vitals taken for this visit.    Physical Exam  Vitals reviewed.   Constitutional:       General: He is not in acute distress.     Appearance: Normal appearance.   HENT:      Head: Normocephalic and atraumatic.   Cardiovascular:      Rate and Rhythm: Normal rate and regular rhythm.    Pulmonary:      Effort: Pulmonary effort is normal. No respiratory distress.      Breath sounds: Normal breath sounds.   Abdominal:      General: Abdomen is flat. There is no distension.      Palpations: Abdomen is soft.      Tenderness: There is no abdominal tenderness.      Comments: Well-healed umbilical incision.  Small, reducible left inguinal hernia.  No right inguinal hernia.   Musculoskeletal:         General: Normal range of motion.   Skin:     General: Skin is warm and dry.   Neurological:      Mental Status: He is alert.         Pertinent labs reviewed  CMP and CBC 28 June 2024 largely unremarkable  Pertinent images and available reads personally reviewed  Reviewed ultrasound images and report  Pertinent notes reviewed  Reviewed the last PCP note     Viet Auguste MD FACS  [facility]  [phone]

## 2024-12-17 NOTE — PROGRESS NOTES
"[facility] History and Physical Note:    Assessment:  Left inguinal hernia, causing some discomfort.  I also explained to the patient that this could represent a cord lipoma which will also be resected at the time of surgery.    Pertinent labs reviewed  CMP and CBC 28 June 2024 largely unremarkable  Pertinent images and available reads personally reviewed  Reviewed ultrasound images and report  Pertinent notes reviewed  Reviewed the last PCP note    Plan:  Left groin exploration and inguinal hernia repair    Chief Complaint:  \"I am here for the hernia on the left\"    HPI    Patient is a 39-year-old gentleman status post repair of a small umbilical hernia September 2024 and now with diagnosis of left inguinal hernia made this month by Dr. Weiss.    An ultrasound of the left groin was performed 7 December 2024:  FINDINGS:  Fat-containing left inguinal hernia. Hernia aperture measures 1.2 cm. No abnormal mass. No bowel within the hernia.  IMPRESSION:  Small fat-containing left inguinal hernia.      PMH:  Past Medical History:   Diagnosis Date    Anxiety     Depression     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     Onset date: 7/30/2010     Eczema     GERD (gastroesophageal reflux disease)     Hypertension     Lyme disease     Onset date: 7/5/2011     Uncomplicated alcohol abuse     Resolved 9/24/2014        PSH:  Past Surgical History:   Procedure Laterality Date    APPENDECTOMY      [place] RPR AA HERNIA 1ST < 3 CM REDUCIBLE N/A 9/6/2024    Procedure: REPAIR HERNIA UMBILICAL;  Surgeon: Viet Auguste MD;  Location: [facility];  Service: General    SINUS SURGERY      TONSILLECTOMY         Home Meds:  Current Outpatient Medications on File Prior to Visit   Medication Sig Dispense Refill    ARIPiprazole (ABILIFY) 2 mg tablet Take 1 tablet (2 mg total) by mouth daily at bedtime 30 tablet 0    hydrocortisone (WESTCORT) 0.2 % cream Apply topically 2 (two) times a day 60 g 3    lisinopril (ZESTRIL) 2.5 mg " "tablet take 1 tablet by mouth once daily 90 tablet 1    traZODone (DESYREL) 100 mg tablet Take 1.5 tablets (150 mg total) by mouth daily at bedtime 90 tablet 1     No current facility-administered medications on file prior to visit.       Allergies:  No Known Allergies    Social Hx:  Social History     Socioeconomic History    Marital status: Single     Spouse name: Not on file    Number of children: Not on file    Years of education: Not on file    Highest education level: Not on file   Occupational History    Not on file   Tobacco Use    Smoking status: Every Day     Current packs/day: 1.00     Average packs/day: 1 pack/day for 10.0 years (10.0 ttl pk-yrs)     Types: Cigarettes     Passive exposure: Current    Smokeless tobacco: Never   Vaping Use    Vaping status: Never Used   Substance and Sexual Activity    Alcohol use: Yes     Alcohol/week: 12.0 standard drinks of alcohol     Types: 12 Shots of liquor per week    Drug use: Yes     Types: Marijuana    Sexual activity: Not on file   Other Topics Concern    Not on file   Social History Narrative    History of cigarette smoker - As per [employer]    Former smoker - As per [employer]    Marijuana - As per [employer]      Social Drivers of Health     Financial Resource Strain: Not on file   Food Insecurity: Not on file   Transportation Needs: Not on file   Physical Activity: Not on file   Stress: Not on file   Social Connections: Not on file   Intimate Partner Violence: Not on file   Housing Stability: Not on file        Family Hx:    No family history on file.      Review of Systems   Constitutional:  Negative for chills and fever.   Respiratory: Negative.     Cardiovascular: Negative.    Genitourinary: Negative.    Neurological: Negative.    Hematological: Negative.    All other systems reviewed and are negative.      /80 (BP Location: Left arm, Patient Position: Sitting, Cuff Size: Large)   Pulse 82   Temp (!) 97.1 °F (36.2 °C) (Core)   Ht 5' 9.5\" (1.765 " m)   Wt 78.7 kg (173 lb 6.4 oz)   SpO2 98%   BMI 25.24 kg/m²         Physical Exam  Vitals reviewed.   Constitutional:       General: He is not in acute distress.     Appearance: Normal appearance.   HENT:      Head: Normocephalic and atraumatic.   Cardiovascular:      Rate and Rhythm: Normal rate and regular rhythm.   Pulmonary:      Effort: Pulmonary effort is normal. No respiratory distress.      Breath sounds: Normal breath sounds.   Abdominal:      General: Abdomen is flat. There is no distension.      Palpations: Abdomen is soft.      Tenderness: There is no abdominal tenderness.      Comments: Well-healed umbilical incision.  Small, reducible left inguinal hernia.  No right inguinal hernia.   Musculoskeletal:         General: Normal range of motion.   Skin:     General: Skin is warm and dry.   Neurological:      Mental Status: He is alert.         Pertinent labs reviewed  CMP and CBC 28 June 2024 largely unremarkable  Pertinent images and available reads personally reviewed  Reviewed ultrasound images and report  Pertinent notes reviewed  Reviewed the last PCP note     Viet Auguste MD [facility]  [phone]

## 2024-12-17 NOTE — H&P (VIEW-ONLY)
"[facility] History and Physical Note:    Assessment:  Left inguinal hernia, causing some discomfort.    Pertinent labs reviewed  CMP and CBC 28 June 2024 largely unremarkable  Pertinent images and available reads personally reviewed  Reviewed ultrasound images and report  Pertinent notes reviewed  Reviewed the last PCP note    Plan:  Left inguinal hernia repair    Chief Complaint:  \"I am here for the hernia on the left\"    HPI    Patient is a 39-year-old gentleman status post repair of a small umbilical hernia September 2024 and now with diagnosis of left inguinal hernia made this month by Dr. Weiss.    An ultrasound of the left groin was performed 7 December 2024:  FINDINGS:  Fat-containing left inguinal hernia. Hernia aperture measures 1.2 cm. No abnormal mass. No bowel within the hernia.  IMPRESSION:  Small fat-containing left inguinal hernia.      PMH:  Past Medical History:   Diagnosis Date    Anxiety     Depression     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     Onset date: 7/30/2010     Eczema     GERD (gastroesophageal reflux disease)     Hypertension     Lyme disease     Onset date: 7/5/2011     Uncomplicated alcohol abuse     Resolved 9/24/2014        PSH:  Past Surgical History:   Procedure Laterality Date    APPENDECTOMY      [place] RPR AA HERNIA 1ST < 3 CM REDUCIBLE N/A 9/6/2024    Procedure: REPAIR HERNIA UMBILICAL;  Surgeon: Viet Auguste MD;  Location: [facility];  Service: General    SINUS SURGERY      TONSILLECTOMY         Home Meds:  Current Outpatient Medications on File Prior to Visit   Medication Sig Dispense Refill    ARIPiprazole (ABILIFY) 2 mg tablet Take 1 tablet (2 mg total) by mouth daily at bedtime 30 tablet 0    hydrocortisone (WESTCORT) 0.2 % cream Apply topically 2 (two) times a day 60 g 3    lisinopril (ZESTRIL) 2.5 mg tablet take 1 tablet by mouth once daily 90 tablet 1    traZODone (DESYREL) 100 mg tablet Take 1.5 tablets (150 mg total) by mouth daily at bedtime " 90 tablet 1     No current facility-administered medications on file prior to visit.       Allergies:  No Known Allergies    Social Hx:  Social History     Socioeconomic History    Marital status: Single     Spouse name: Not on file    Number of children: Not on file    Years of education: Not on file    Highest education level: Not on file   Occupational History    Not on file   Tobacco Use    Smoking status: Every Day     Current packs/day: 1.00     Average packs/day: 1 pack/day for 10.0 years (10.0 ttl pk-yrs)     Types: Cigarettes     Passive exposure: Current    Smokeless tobacco: Never   Vaping Use    Vaping status: Never Used   Substance and Sexual Activity    Alcohol use: Yes     Alcohol/week: 12.0 standard drinks of alcohol     Types: 12 Shots of liquor per week    Drug use: Yes     Types: Marijuana    Sexual activity: Not on file   Other Topics Concern    Not on file   Social History Narrative    History of cigarette smoker - As per [employer]    Former smoker - As per [employer]    Marijuana - As per [employer]      Social Drivers of Health     Financial Resource Strain: Not on file   Food Insecurity: Not on file   Transportation Needs: Not on file   Physical Activity: Not on file   Stress: Not on file   Social Connections: Not on file   Intimate Partner Violence: Not on file   Housing Stability: Not on file        Family Hx:    No family history on file.      Review of Systems   Constitutional:  Negative for chills and fever.   Respiratory: Negative.     Cardiovascular: Negative.    Genitourinary: Negative.    Neurological: Negative.    Hematological: Negative.    All other systems reviewed and are negative.      There were no vitals taken for this visit.    Physical Exam  Vitals reviewed.   Constitutional:       General: He is not in acute distress.     Appearance: Normal appearance.   HENT:      Head: Normocephalic and atraumatic.   Cardiovascular:      Rate and Rhythm: Normal rate and regular rhythm.    Pulmonary:      Effort: Pulmonary effort is normal. No respiratory distress.      Breath sounds: Normal breath sounds.   Abdominal:      General: Abdomen is flat. There is no distension.      Palpations: Abdomen is soft.      Tenderness: There is no abdominal tenderness.      Comments: Well-healed umbilical incision.  Small, reducible left inguinal hernia.  No right inguinal hernia.   Musculoskeletal:         General: Normal range of motion.   Skin:     General: Skin is warm and dry.   Neurological:      Mental Status: He is alert.         Pertinent labs reviewed  CMP and CBC 28 June 2024 largely unremarkable  Pertinent images and available reads personally reviewed  Reviewed ultrasound images and report  Pertinent notes reviewed  Reviewed the last PCP note     Viet Auguste MD FACS  [facility]  [phone]

## 2024-12-17 NOTE — TELEPHONE ENCOUNTER
Pt called to advise that he has to keep his initial appt time. He won't be able to make it in earlier

## 2024-12-18 ENCOUNTER — ANESTHESIA EVENT (OUTPATIENT)
Dept: PERIOP | Facility: HOSPITAL | Age: 39
End: 2024-12-18
Payer: COMMERCIAL

## 2024-12-18 NOTE — PRE-PROCEDURE INSTRUCTIONS
Pre-Surgery Instructions:   Medication Instructions    ARIPiprazole (ABILIFY) 2 mg tablet Take night before surgery    hydrocortisone (WESTCORT) 0.2 % cream Stop taking 1 day prior to surgery.    lisinopril (ZESTRIL) 2.5 mg tablet Hold day of surgery.    traZODone (DESYREL) 100 mg tablet Take night before surgery    Medication instructions for day surgery reviewed. Please use only a sip of water to take your instructed medications. Avoid all over the counter vitamins, supplements and NSAIDS for one week prior to surgery per anesthesia guidelines. Tylenol is ok to take as needed.     You will receive a call one business day prior to surgery with an arrival time and hospital directions. If your surgery is scheduled on a Monday, the hospital will be calling you on the Friday prior to your surgery. If you have not heard from anyone by 8pm, please call the hospital supervisor through the hospital  at [phone]. ([place] [phone] or [place] [phone]).    Do not eat or drink anything after midnight the night before your surgery, including candy, mints, lifesavers, or chewing gum. Do not drink alcohol 24hrs before your surgery. Try not to smoke at least 24hrs before your surgery.       Follow the pre surgery showering instructions as listed in the “My Surgical Experience Booklet” or otherwise provided by your surgeon's office. Do not use a blade to shave the surgical area 1 week before surgery. It is okay to use a clean electric clippers up to 24 hours before surgery. Do not apply any lotions, creams, including makeup, cologne, deodorant, or perfumes after showering on the day of your surgery. Do not use dry shampoo, hair spray, hair gel, or any type of hair products.     No contact lenses, eye make-up, or artificial eyelashes. Remove nail polish, including gel polish, and any artificial, gel, or acrylic nails if possible. Remove all jewelry including rings and body piercing jewelry.     Wear causal  clothing that is easy to take on and off. Consider your type of surgery.    Keep any valuables, jewelry, piercings at home. Please bring any specially ordered equipment (sling, braces) if indicated.    Arrange for a responsible person to drive you to and from the hospital on the day of your surgery. Please confirm the visitor policy for the day of your procedure when you receive your phone call with an arrival time.     Call the surgeon's office with any new illnesses, exposures, or additional questions prior to surgery.    Please reference your “My Surgical Experience Booklet” for additional information to prepare for your upcoming surgery.

## 2024-12-20 ENCOUNTER — HOSPITAL ENCOUNTER (OUTPATIENT)
Facility: HOSPITAL | Age: 39
Setting detail: OUTPATIENT SURGERY
Discharge: HOME | End: 2024-12-20
Attending: SURGERY | Admitting: SURGERY
Payer: COMMERCIAL

## 2024-12-20 ENCOUNTER — ANESTHESIA (OUTPATIENT)
Dept: PERIOP | Facility: HOSPITAL | Age: 39
End: 2024-12-20
Payer: COMMERCIAL

## 2024-12-20 VITALS
TEMPERATURE: 97.9 F | DIASTOLIC BLOOD PRESSURE: 85 MMHG | WEIGHT: 171.3 LBS | OXYGEN SATURATION: 99 % | HEART RATE: 81 BPM | RESPIRATION RATE: 18 BRPM | SYSTOLIC BLOOD PRESSURE: 131 MMHG | BODY MASS INDEX: 24.93 KG/M2

## 2024-12-20 DIAGNOSIS — K40.90 LEFT INGUINAL HERNIA: Primary | ICD-10-CM

## 2024-12-20 PROBLEM — F17.200 SMOKING: Status: ACTIVE | Noted: 2024-12-20

## 2024-12-20 PROBLEM — IMO0001 SMOKING: Status: ACTIVE | Noted: 2024-12-20

## 2024-12-20 PROCEDURE — 49505 PRP I/HERN INIT REDUC >5 YR: CPT | Performed by: SURGERY

## 2024-12-20 PROCEDURE — 88342 IMHCHEM/IMCYTCHM 1ST ANTB: CPT | Performed by: PATHOLOGY

## 2024-12-20 PROCEDURE — 88302 TISSUE EXAM BY PATHOLOGIST: CPT | Performed by: PATHOLOGY

## 2024-12-20 PROCEDURE — C1781 MESH (IMPLANTABLE): HCPCS | Performed by: SURGERY

## 2024-12-20 PROCEDURE — 49505 PRP I/HERN INIT REDUC >5 YR: CPT | Performed by: PHYSICIAN ASSISTANT

## 2024-12-20 DEVICE — BARD MESH, 1" X 4" (2.5 CM X 10 CM)
Type: IMPLANTABLE DEVICE | Site: INGUINAL | Status: FUNCTIONAL
Brand: BARD

## 2024-12-20 RX ORDER — OXYCODONE AND ACETAMINOPHEN 5; 325 MG/1; MG/1
1 TABLET ORAL EVERY 6 HOURS PRN
Qty: 8 TABLET | Refills: 0 | Status: SHIPPED | OUTPATIENT
Start: 2024-12-20

## 2024-12-20 RX ORDER — CEFAZOLIN SODIUM 2 G/50ML
2000 SOLUTION INTRAVENOUS ONCE
Status: DISCONTINUED | OUTPATIENT
Start: 2024-12-20 | End: 2024-12-20 | Stop reason: HOSPADM

## 2024-12-20 RX ORDER — CEFAZOLIN SODIUM 2 G/50ML
SOLUTION INTRAVENOUS AS NEEDED
Status: DISCONTINUED | OUTPATIENT
Start: 2024-12-20 | End: 2024-12-20

## 2024-12-20 RX ORDER — MIDAZOLAM HYDROCHLORIDE 2 MG/2ML
INJECTION, SOLUTION INTRAMUSCULAR; INTRAVENOUS AS NEEDED
Status: DISCONTINUED | OUTPATIENT
Start: 2024-12-20 | End: 2024-12-20

## 2024-12-20 RX ORDER — KETOROLAC TROMETHAMINE 30 MG/ML
15 INJECTION, SOLUTION INTRAMUSCULAR; INTRAVENOUS ONCE
Status: COMPLETED | OUTPATIENT
Start: 2024-12-20 | End: 2024-12-20

## 2024-12-20 RX ORDER — DEXAMETHASONE SODIUM PHOSPHATE 10 MG/ML
INJECTION, SOLUTION INTRAMUSCULAR; INTRAVENOUS AS NEEDED
Status: DISCONTINUED | OUTPATIENT
Start: 2024-12-20 | End: 2024-12-20

## 2024-12-20 RX ORDER — PROPOFOL 10 MG/ML
INJECTION, EMULSION INTRAVENOUS AS NEEDED
Status: DISCONTINUED | OUTPATIENT
Start: 2024-12-20 | End: 2024-12-20

## 2024-12-20 RX ORDER — FENTANYL CITRATE/PF 50 MCG/ML
50 SYRINGE (ML) INJECTION
Status: DISCONTINUED | OUTPATIENT
Start: 2024-12-20 | End: 2024-12-20 | Stop reason: HOSPADM

## 2024-12-20 RX ORDER — MAGNESIUM HYDROXIDE 1200 MG/15ML
LIQUID ORAL AS NEEDED
Status: DISCONTINUED | OUTPATIENT
Start: 2024-12-20 | End: 2024-12-20 | Stop reason: HOSPADM

## 2024-12-20 RX ORDER — PROMETHAZINE HYDROCHLORIDE 25 MG/ML
25 INJECTION, SOLUTION INTRAMUSCULAR; INTRAVENOUS ONCE AS NEEDED
Status: DISCONTINUED | OUTPATIENT
Start: 2024-12-20 | End: 2024-12-20 | Stop reason: HOSPADM

## 2024-12-20 RX ORDER — SODIUM CHLORIDE, SODIUM LACTATE, POTASSIUM CHLORIDE, CALCIUM CHLORIDE 600; 310; 30; 20 MG/100ML; MG/100ML; MG/100ML; MG/100ML
125 INJECTION, SOLUTION INTRAVENOUS CONTINUOUS
Status: DISCONTINUED | OUTPATIENT
Start: 2024-12-20 | End: 2024-12-20 | Stop reason: HOSPADM

## 2024-12-20 RX ORDER — SODIUM CHLORIDE, SODIUM LACTATE, POTASSIUM CHLORIDE, CALCIUM CHLORIDE 600; 310; 30; 20 MG/100ML; MG/100ML; MG/100ML; MG/100ML
INJECTION, SOLUTION INTRAVENOUS CONTINUOUS PRN
Status: DISCONTINUED | OUTPATIENT
Start: 2024-12-20 | End: 2024-12-20

## 2024-12-20 RX ORDER — BUPIVACAINE HYDROCHLORIDE AND EPINEPHRINE 5; 5 MG/ML; UG/ML
INJECTION, SOLUTION PERINEURAL AS NEEDED
Status: DISCONTINUED | OUTPATIENT
Start: 2024-12-20 | End: 2024-12-20 | Stop reason: HOSPADM

## 2024-12-20 RX ORDER — FENTANYL CITRATE 50 UG/ML
INJECTION, SOLUTION INTRAMUSCULAR; INTRAVENOUS AS NEEDED
Status: DISCONTINUED | OUTPATIENT
Start: 2024-12-20 | End: 2024-12-20

## 2024-12-20 RX ORDER — OXYCODONE AND ACETAMINOPHEN 5; 325 MG/1; MG/1
1 TABLET ORAL ONCE
Refills: 0 | Status: COMPLETED | OUTPATIENT
Start: 2024-12-20 | End: 2024-12-20

## 2024-12-20 RX ORDER — LIDOCAINE HYDROCHLORIDE 10 MG/ML
INJECTION, SOLUTION EPIDURAL; INFILTRATION; INTRACAUDAL; PERINEURAL AS NEEDED
Status: DISCONTINUED | OUTPATIENT
Start: 2024-12-20 | End: 2024-12-20

## 2024-12-20 RX ORDER — ONDANSETRON 2 MG/ML
INJECTION INTRAMUSCULAR; INTRAVENOUS AS NEEDED
Status: DISCONTINUED | OUTPATIENT
Start: 2024-12-20 | End: 2024-12-20

## 2024-12-20 RX ADMIN — MIDAZOLAM 3 MG: 1 INJECTION INTRAMUSCULAR; INTRAVENOUS at 12:13

## 2024-12-20 RX ADMIN — SODIUM CHLORIDE, SODIUM LACTATE, POTASSIUM CHLORIDE, AND CALCIUM CHLORIDE: .6; .31; .03; .02 INJECTION, SOLUTION INTRAVENOUS at 12:11

## 2024-12-20 RX ADMIN — MIDAZOLAM 1 MG: 1 INJECTION INTRAMUSCULAR; INTRAVENOUS at 12:15

## 2024-12-20 RX ADMIN — OXYCODONE HYDROCHLORIDE AND ACETAMINOPHEN 1 TABLET: 5; 325 TABLET ORAL at 15:11

## 2024-12-20 RX ADMIN — DEXAMETHASONE SODIUM PHOSPHATE 10 MG: 10 INJECTION, SOLUTION INTRAMUSCULAR; INTRAVENOUS at 12:31

## 2024-12-20 RX ADMIN — LIDOCAINE HYDROCHLORIDE 50 MG: 10 INJECTION, SOLUTION EPIDURAL; INFILTRATION; INTRACAUDAL; PERINEURAL at 12:19

## 2024-12-20 RX ADMIN — ONDANSETRON 4 MG: 2 INJECTION INTRAMUSCULAR; INTRAVENOUS at 12:31

## 2024-12-20 RX ADMIN — FENTANYL CITRATE 100 MCG: 50 INJECTION, SOLUTION INTRAMUSCULAR; INTRAVENOUS at 12:20

## 2024-12-20 RX ADMIN — PROPOFOL 300 MG: 10 INJECTION, EMULSION INTRAVENOUS at 12:19

## 2024-12-20 RX ADMIN — PROPOFOL 100 MG: 10 INJECTION, EMULSION INTRAVENOUS at 12:20

## 2024-12-20 RX ADMIN — FENTANYL CITRATE 50 MCG: 50 INJECTION INTRAMUSCULAR; INTRAVENOUS at 13:27

## 2024-12-20 RX ADMIN — KETOROLAC TROMETHAMINE 15 MG: 30 INJECTION, SOLUTION INTRAMUSCULAR at 15:11

## 2024-12-20 RX ADMIN — CEFAZOLIN SODIUM 2000 MG: 2 SOLUTION INTRAVENOUS at 12:13

## 2024-12-20 NOTE — INTERVAL H&P NOTE
H&P reviewed. After examining the patient I find no changes in the patients condition since the H&P had been written.    I reviewed vital signs from this morning

## 2024-12-20 NOTE — NURSING NOTE
Patient returned from PACU alert and oriented times 4. Per pt, 3/10 pain. Site is covered by exofin glue, no drainage noted. Call bell within reach, beverage at bedside, pt stated to call ride once ready to go

## 2024-12-20 NOTE — DISCHARGE INSTR - AVS FIRST PAGE
1.  Keep incision clean and dry for 2 days.  After 2 days, it may get wet in the shower.  No dressing is required.  2.  Take the Aleve that you have at home, 1 tablet, every 8 hours regularly.  3.  Take Percocet, in addition to the Aleve, if you need further pain control.  4.  If you do not already have an appointment, call my office at [phone] for an appointment to be seen in about 10 to 14 days.

## 2024-12-20 NOTE — ANESTHESIA POSTPROCEDURE EVALUATION
Post-Op Assessment Note    CV Status:  Stable    Pain management: adequate       Mental Status:  Alert and awake   Hydration Status:  Euvolemic   PONV Controlled:  Controlled   Airway Patency:  Patent     Post Op Vitals Reviewed: Yes    No anethesia notable event occurred.            Last Filed PACU Vitals:  Vitals Value Taken Time   Temp 97.4 °F (36.3 °C) 12/20/24 1315   Pulse 70 12/20/24 1345   /94 12/20/24 1345   Resp 20 12/20/24 1345   SpO2 99 % 12/20/24 1345       Modified Meghan:  Activity: 2 (12/20/2024  2:05 PM)  Respiration: 2 (12/20/2024  2:05 PM)  Circulation: 2 (12/20/2024  2:05 PM)  Consciousness: 2 (12/20/2024  2:05 PM)  Oxygen Saturation: 2 (12/20/2024  2:05 PM)  Modified Meghan Score: 10 (12/20/2024  2:05 PM)

## 2024-12-20 NOTE — ANESTHESIA PREPROCEDURE EVALUATION
Procedure:  REPAIR HERNIA INGUINAL (Left: Groin)    Relevant Problems   CARDIO   (+) Hypertension      NEURO/PSYCH   (+) Depression with anxiety   (+) PTSD (post-traumatic stress disorder)   (+) Recurrent major depressive disorder, in partial remission (HCC)      PULMONARY   (+) Smoking      Behavioral Health   (+) Alcohol dependence, continuous (HCC)   1ppd smoker       Physical Exam    Airway    Mallampati score: II  TM Distance: >3 FB  Neck ROM: full     Dental   Comment: Denies loose teeth     Cardiovascular  Cardiovascular exam normal    Pulmonary  Pulmonary exam normal     Other Findings  Portions of exam deferred due to low yield and/or unknown COVID status      Anesthesia Plan  ASA Score- 2     Anesthesia Type- general with ASA Monitors.         Additional Monitors:     Airway Plan: LMA.           Plan Factors-Exercise tolerance (METS): >4 METS.    Chart reviewed.   Existing labs reviewed. Patient summary reviewed.    Patient is a current smoker.      Obstructive sleep apnea risk education given perioperatively.        Induction- intravenous.    Postoperative Plan-         Informed Consent- Anesthetic plan and risks discussed with patient.  I personally reviewed this patient with the CRNA. Discussed and agreed on the Anesthesia Plan with the CRNA..

## 2024-12-20 NOTE — OP NOTE
OPERATIVE REPORT  PATIENT NAME: Obed Bae    :  1985  MRN: [id number]  Pt Location: [facility] OR ROOM 03    SURGERY DATE: 2024    Surgeons and Role:     * Viet Auguste MD - Primary     * Favio Zhou PA-C - Assisting    Preop Diagnosis:  Left inguinal hernia [K40.90]    Post-Op Diagnosis Codes:     * Left inguinal hernia [K40.90]    Procedure(s):  Left - REPAIR HERNIA INGUINAL    Specimen(s):  ID Type Source Tests Collected by Time Destination   1 : LEFT HERNIA SAC Tissue Hernia Sac, Left Inguinal TISSUE EXAM Viet Auguste MD 2024 1244        Estimated Blood Loss:   Minimal    Drains:  * No LDAs found *    Anesthesia Type:   General    Operative Indications:  Left inguinal hernia [K40.90]      Operative Findings:  Left indirect inguinal hernia      Complications:   None    Procedure and Technique:  Left inguinal hernia repair with mesh.    Patient is taken back the operating, placed supine the operative table, general anesthesia was induced, and the left groin was prepped and draped in normal fashion.    Utilizing an incision superior to the medial aspect of the inguinal ligament, skin was incised.  Soft tissue was divided with Bovie cautery.  The external bleak aponeurosis was opened in direction of its fibers and the ilioinguinal nerve was sacrificed.    A Penrose drain was placed around all spermatic cord contents.  There was no direct hernia.    The cremaster muscles were opened in the direction of its fibers and a indirect hernia sac was dissected free from the cord.  This underwent high ligation of the distal portion of the sac was sent off the field as a specimen.    The ear was copiously irrigated and the inguinal canal floor was reconstructed with polypropylene mesh.  This was sewn in a continuous fashion utilizing 2-0 Prolene to the shelving edge of the inguinal ligament inferior laterally as well as to the conjoined tendon superior and medially.  Tails of the mesh were wrapped  around the spermatic cord to fashion a new deep ring.    The spermatic cord was returned to its anatomical position in the inguinal canal.  The area was irrigated and external bleak aponeurosis was closed with 2-0 Vicryl.  3-0 Vicryl was used approximate Cheyanne's fascia as well as the dermis.  4-0 Monocryl was used approximate the skin edges.  Exofin was placed as a dressing.  At the end of the case a large field block was created.    The patient woke from general anesthesia and was extubated the operating, and sent to the PACU in stable condition    JER Zhou was required for technical assistance and retraction   I was present for the entire procedure., A qualified resident physician was not available., and A physician assistant was required during the procedure for retraction, tissue handling, dissection and suturing.    Patient Disposition:  PACU         SIGNATURE: Viet Auguste MD  DATE: December 20, 2024  TIME: 12:57 PM

## 2024-12-25 ENCOUNTER — HOSPITAL ENCOUNTER (EMERGENCY)
Facility: HOSPITAL | Age: 39
Discharge: HOME | End: 2024-12-25
Attending: EMERGENCY MEDICINE
Payer: COMMERCIAL

## 2024-12-25 VITALS
TEMPERATURE: 99 F | HEART RATE: 88 BPM | SYSTOLIC BLOOD PRESSURE: 139 MMHG | RESPIRATION RATE: 20 BRPM | DIASTOLIC BLOOD PRESSURE: 84 MMHG | OXYGEN SATURATION: 96 %

## 2024-12-25 DIAGNOSIS — G89.18 POSTOPERATIVE PAIN: Primary | ICD-10-CM

## 2024-12-25 PROCEDURE — 99283 EMERGENCY DEPT VISIT LOW MDM: CPT | Performed by: EMERGENCY MEDICINE

## 2024-12-25 PROCEDURE — 99283 EMERGENCY DEPT VISIT LOW MDM: CPT

## 2024-12-25 NOTE — ED PROVIDER NOTES
Time reflects when diagnosis was documented in both MDM as applicable and the Disposition within this note       Time User Action Codes Description Comment    12/25/2024 12:21 PM Nile Garcia Add [G89.18] Postoperative pain           ED Disposition       ED Disposition   Discharge    Condition   Stable    Date/Time   Wed Dec 25, 2024 12:21 PM    Comment   Obed KRISTIN Kathia discharge to home/self care.                   Assessment & Plan       Medical Decision Making           Medications - No data to display    ED Risk Strat Scores                                              History of Present Illness       Chief Complaint   Patient presents with    Post-op Problem     Got hernia repair on 20th,, believes incision is infected.       Past Medical History:   Diagnosis Date    Anxiety     Depression     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     Onset date: 7/30/2010     Eczema     GERD (gastroesophageal reflux disease)     Hypertension     Lyme disease     Onset date: 7/5/2011     Uncomplicated alcohol abuse     Resolved 9/24/2014       Past Surgical History:   Procedure Laterality Date    APPENDECTOMY      IN RPR 1ST INGUN HRNA AGE 4 YRS/> REDUCIBLE Left 12/20/2024    Procedure: REPAIR HERNIA INGUINAL;  Surgeon: Viet Auguste MD;  Location: [facility];  Service: General    IN RPR AA HERNIA 1ST < 3 CM REDUCIBLE N/A 9/6/2024    Procedure: REPAIR HERNIA UMBILICAL;  Surgeon: Viet Auguste MD;  Location: [facility];  Service: General    SINUS SURGERY      TONSILLECTOMY        History reviewed. No pertinent family history.   Social History     Tobacco Use    Smoking status: Every Day     Current packs/day: 1.00     Average packs/day: 1 pack/day for 10.0 years (10.0 ttl pk-yrs)     Types: Cigarettes     Passive exposure: Current    Smokeless tobacco: Never   Vaping Use    Vaping status: Never Used   Substance Use Topics    Alcohol use: Yes     Alcohol/week: 12.0 standard drinks of alcohol     Types: 12 Shots of liquor per  week     Comment: occ    Drug use: Yes     Types: Marijuana     Comment: occ      E-Cigarette/Vaping    E-Cigarette Use Never User       E-Cigarette/Vaping Substances      I have reviewed and agree with the history as documented.     Patient underwent left inguinal herniorrhaphy with mesh repair 5 days prior to arrival.  He has had umbilical hernia repair in the past.  Patient states he is having increasing pain in the last 2 days in the area of the incision which made him concerned for possible infection.  Patient does not have a fever.  He is tolerating food and drink normally.  Bowel function has returned to normal.  No dysuria.  No drainage from the wound.        Review of Systems   Constitutional:  Negative for chills and fever.   HENT:  Negative for congestion.    Eyes:  Negative for visual disturbance.   Respiratory:  Negative for cough and shortness of breath.    Cardiovascular:  Negative for chest pain.   Gastrointestinal:  Negative for abdominal pain and vomiting.   Genitourinary:  Negative for decreased urine volume, difficulty urinating, dysuria, hematuria, penile discharge, penile swelling, scrotal swelling and testicular pain.   Musculoskeletal:  Negative for back pain.   Skin:  Positive for wound. Negative for rash.   Neurological:  Negative for weakness.   Hematological:  Does not bruise/bleed easily.   Psychiatric/Behavioral:  Negative for confusion.    All other systems reviewed and are negative.          Objective       ED Triage Vitals [12/25/24 1159]   Temperature Pulse Blood Pressure Respirations SpO2 Patient Position - Orthostatic VS   99 °F (37.2 °C) 88 139/84 20 96 % Sitting      Temp Source Heart Rate Source BP Location FiO2 (%) Pain Score    Tympanic Monitor Right arm -- 4      Vitals      Date and Time Temp Pulse SpO2 Resp BP Pain Score FACES Pain Rating User   12/25/24 1159 99 °F (37.2 °C) 88 96 % 20 139/84 4 -- LS            Physical Exam  Vitals and nursing note reviewed.    Constitutional:       Appearance: Normal appearance.   HENT:      Head: Normocephalic.      Right Ear: External ear normal.      Left Ear: External ear normal.      Nose: Nose normal.      Mouth/Throat:      Mouth: Mucous membranes are moist.   Eyes:      Conjunctiva/sclera: Conjunctivae normal.   Cardiovascular:      Rate and Rhythm: Normal rate and regular rhythm.      Pulses: Normal pulses.   Pulmonary:      Effort: Pulmonary effort is normal.      Breath sounds: Normal breath sounds.   Abdominal:      Palpations: Abdomen is soft.      Tenderness: There is no abdominal tenderness.   Genitourinary:     Penis: Normal.       Testes: Normal.      Comments: Fresh LIH incision healing well without overt signs of infection  Musculoskeletal:         General: Normal range of motion.      Cervical back: Normal range of motion.   Skin:     General: Skin is warm and dry.      Capillary Refill: Capillary refill takes less than 2 seconds.   Neurological:      General: No focal deficit present.      Mental Status: He is alert.   Psychiatric:         Mood and Affect: Mood normal.         Results Reviewed       None            No orders to display       Procedures    ED Medication and Procedure Management   Prior to Admission Medications   Prescriptions Last Dose Informant Patient Reported? Taking?   ARIPiprazole (ABILIFY) 2 mg tablet Not Taking  No No   Sig: Take 1 tablet (2 mg total) by mouth daily at bedtime   Patient not taking: Reported on 12/25/2024   hydrocortisone (WESTCORT) 0.2 % cream Not Taking  No No   Sig: Apply topically 2 (two) times a day   Patient not taking: Reported on 12/25/2024   lisinopril (ZESTRIL) 2.5 mg tablet   No No   Sig: take 1 tablet by mouth once daily   oxyCODONE-acetaminophen (Percocet) 5-325 mg per tablet Not Taking  No No   Sig: Take 1 tablet by mouth every 6 (six) hours as needed for moderate pain for up to 8 doses Max Daily Amount: 4 tablets   Patient not taking: Reported on 12/25/2024    traZODone (DESYREL) 100 mg tablet   No No   Sig: Take 1.5 tablets (150 mg total) by mouth daily at bedtime      Facility-Administered Medications: None     Discharge Medication List as of 12/25/2024 12:21 PM        CONTINUE these medications which have NOT CHANGED    Details   ARIPiprazole (ABILIFY) 2 mg tablet Take 1 tablet (2 mg total) by mouth daily at bedtime, Starting Thu 12/12/2024, Normal      hydrocortisone (WESTCORT) 0.2 % cream Apply topically 2 (two) times a day, Starting Fri 10/27/2023, Normal      lisinopril (ZESTRIL) 2.5 mg tablet take 1 tablet by mouth once daily, Starting Mon 11/25/2024, Normal      oxyCODONE-acetaminophen (Percocet) 5-325 mg per tablet Take 1 tablet by mouth every 6 (six) hours as needed for moderate pain for up to 8 doses Max Daily Amount: 4 tablets, Starting Fri 12/20/2024, Normal      traZODone (DESYREL) 100 mg tablet Take 1.5 tablets (150 mg total) by mouth daily at bedtime, Starting Thu 12/12/2024, Normal           No discharge procedures on file.  ED SEPSIS DOCUMENTATION   Time reflects when diagnosis was documented in both MDM as applicable and the Disposition within this note       Time User Action Codes Description Comment    12/25/2024 12:21 PM Nile Garcia Add [G89.18] Postoperative pain                  Nile Garcia MD  12/25/24 [id number]

## 2024-12-26 PROCEDURE — 88302 TISSUE EXAM BY PATHOLOGIST: CPT | Performed by: PATHOLOGY

## 2024-12-26 PROCEDURE — 88342 IMHCHEM/IMCYTCHM 1ST ANTB: CPT | Performed by: PATHOLOGY

## 2025-01-03 DIAGNOSIS — G47.00 INSOMNIA, UNSPECIFIED TYPE: ICD-10-CM

## 2025-01-03 DIAGNOSIS — F43.21 SITUATIONAL DEPRESSION: ICD-10-CM

## 2025-01-05 RX ORDER — ARIPIPRAZOLE 2 MG/1
2 TABLET ORAL
Qty: 30 TABLET | Refills: 0 | Status: SHIPPED | OUTPATIENT
Start: 2025-01-05 | End: 2025-01-16

## 2025-01-06 NOTE — PROGRESS NOTES
Patient status post left inguinal hernia repair 20 December 2024.  He is here for routine follow-up.  He reports minimal pain and no wound issues.    Pathology report:  Final Diagnosis   A. Hernia Sac, Left Inguinal, LEFT HERNIA SAC:  -Benign fibromembranous with overlying mesothelium and adipose tissue, consistent with tissue from hernia       Incision healing nicely.  No signs infection.  Patient counseled.  Follow-up as needed.

## 2025-01-07 ENCOUNTER — OFFICE VISIT (OUTPATIENT)
Dept: SURGERY | Facility: CLINIC | Age: 40
End: 2025-01-07

## 2025-01-07 VITALS — WEIGHT: 172.8 LBS | HEIGHT: 70 IN | BODY MASS INDEX: 24.74 KG/M2 | TEMPERATURE: 97.1 F

## 2025-01-07 DIAGNOSIS — Z09 SURGERY FOLLOW-UP EXAMINATION: Primary | ICD-10-CM

## 2025-01-07 PROCEDURE — 99024 POSTOP FOLLOW-UP VISIT: CPT | Performed by: SURGERY

## 2025-01-10 ENCOUNTER — PATIENT MESSAGE (OUTPATIENT)
Dept: FAMILY MEDICINE CLINIC | Facility: CLINIC | Age: 40
End: 2025-01-10

## 2025-01-10 ENCOUNTER — NURSE TRIAGE (OUTPATIENT)
Age: 40
End: 2025-01-10

## 2025-01-10 DIAGNOSIS — F43.21 SITUATIONAL DEPRESSION: Primary | ICD-10-CM

## 2025-01-10 NOTE — TELEPHONE ENCOUNTER
"Pt quit drinking alcohol on Monday and was feeling great and today he feels depressed, does not want to do anything and feels very anxious. Denies SI and homicidal ideations. Pt is currently at work and declined an appt for today. Has an appt next week with Dr Veliz. Requesting a call back on what is the next step.  Care advice given. ED precautions given.      Reason for Disposition   Depression is getting worse (e.g.,sleeping poorly, less able to do activities of daily living)    Answer Assessment - Initial Assessment Questions  1. CONCERN: \"What happened that made you call today?\"      depression  2. DEPRESSION SYMPTOM SCREENING: \"How are you feeling overall?\" (e.g., decreased energy, increased sleeping or difficulty sleeping, difficulty concentrating, feelings of sadness, guilt, hopelessness, or worthlessness)      Loss of appetite, loss of interest, no concentration,  3. RISK OF HARM - SUICIDAL IDEATION:  \"Do you ever have thoughts of hurting or killing yourself?\"  (e.g., yes, no, no but preoccupation with thoughts about death)      denies  4. RISK OF HARM - HOMICIDAL IDEATION:  \"Do you ever have thoughts of hurting or killing someone else?\"  (e.g., yes, no, no but preoccupation with thoughts about death)      denies  5. FUNCTIONAL IMPAIRMENT: \"How have things been going for you overall? Have you had more difficulty than usual doing your normal daily activities?\"  (e.g., better, same, worse; self-care, school, work, interactions)      yes  6. SUPPORT: \"Who is with you now?\" \"Who do you live with?\" \"Do you have family or friends who you can talk to?\"       At work  7. THERAPIST: \"Do you have a counselor or therapist?\" If Yes, ask: \"What is their name?\"      no  8. STRESSORS: \"Has there been any new stress or recent changes in your life?\"      Quit drinkjng  9. ALCOHOL USE OR SUBSTANCE USE (DRUG USE): \"Do you drink alcohol or use any illegal drugs?\"      Quit drinking  10. OTHER: \"Do you have any other physical " "symptoms right now?\" (e.g., fever)        no    Protocols used: Depression-Adult-[place]    "

## 2025-01-10 NOTE — TELEPHONE ENCOUNTER
If he isn't at work today and having SEVERE depression. [facility]  at  [address] takes walkin to help with crash depression. [phone] They are great. I do want to get Blood work on him . I AM PROUD of him; Medication I give him won't start working for depression for at least a month, I could start it right away, but to avoid worsening depression he could try going to this center? Let me know his thoughts

## 2025-01-10 NOTE — TELEPHONE ENCOUNTER
"Regarding: deoression  ----- Message from Portia MACARIO sent at 1/10/2025 12:51 PM EST -----  Message in My Chart:  \"I stopped drinking on Sunday 1 5 25 n I am having severe depression n don't know what to do\"    "

## 2025-01-16 ENCOUNTER — OFFICE VISIT (OUTPATIENT)
Dept: FAMILY MEDICINE CLINIC | Facility: CLINIC | Age: 40
End: 2025-01-16
Payer: COMMERCIAL

## 2025-01-16 VITALS
RESPIRATION RATE: 18 BRPM | TEMPERATURE: 97.2 F | OXYGEN SATURATION: 98 % | WEIGHT: 170.8 LBS | HEIGHT: 70 IN | HEART RATE: 65 BPM | BODY MASS INDEX: 24.45 KG/M2 | DIASTOLIC BLOOD PRESSURE: 80 MMHG | SYSTOLIC BLOOD PRESSURE: 110 MMHG

## 2025-01-16 DIAGNOSIS — F40.10 SOCIAL ANXIETY DISORDER: Primary | ICD-10-CM

## 2025-01-16 DIAGNOSIS — G47.00 INSOMNIA, UNSPECIFIED TYPE: ICD-10-CM

## 2025-01-16 DIAGNOSIS — F10.10 ETOH ABUSE: ICD-10-CM

## 2025-01-16 PROCEDURE — 99214 OFFICE O/P EST MOD 30 MIN: CPT | Performed by: FAMILY MEDICINE

## 2025-01-16 RX ORDER — TRAZODONE HYDROCHLORIDE 100 MG/1
150 TABLET ORAL
Qty: 90 TABLET | Refills: 1 | Status: SHIPPED | OUTPATIENT
Start: 2025-01-16

## 2025-01-16 RX ORDER — LORAZEPAM 1 MG/1
TABLET ORAL
Qty: 5 TABLET | Refills: 0 | Status: SHIPPED | OUTPATIENT
Start: 2025-01-16

## 2025-01-16 NOTE — PROGRESS NOTES
Obed Bae 1985 male MRN: [id number]    [facility] PRACTICE OFFICE VISIT  [facility] - [facility]      ASSESSMENT/PLAN  Obed Bae is a 39 y.o. male presents to the office for    Diagnoses and all orders for this visit:    Social anxiety disorder  -     LORazepam (ATIVAN) 1 mg tablet; Take 1/2- 1 tablet as needed for anxiety attacks.    Insomnia, unspecified type  -     traZODone (DESYREL) 100 mg tablet; Take 1.5 tablets (150 mg total) by mouth daily at bedtime    ETOH abuse       Extremely proud of this patient.  He has significantly changed his life around.  Continue trazodone.  Will discontinue Abilify given it was not making the patient feel good.  Recommend 5 tablets of Ativan to have in his pocket just in case patient has any urges or any panic attacks  Discontinue lisinopril         No future appointments.       SUBJECTIVE  CC: Follow-up      HPI:  Obed Bae is a 39 y.o. male who presents for an follow up.  Patient states overall he has been doing tremendously well.  States that he has energy that he has never had since limiting alcohol.  Does sleep very well.  Feels better off the Abilify did not like the way it made him feel.  States that it made him feel very wired      Review of Systems   Constitutional:  Negative for activity change, appetite change, chills, fatigue and fever.   HENT:  Negative for congestion.    Respiratory:  Negative for cough, chest tightness and shortness of breath.    Cardiovascular:  Negative for chest pain and leg swelling.   Gastrointestinal:  Negative for abdominal distention, abdominal pain, constipation, diarrhea, nausea and vomiting.   Psychiatric/Behavioral:  The patient is nervous/anxious.    All other systems reviewed and are negative.      Historical Information   The patient history was reviewed as follows:  Past Medical History:   Diagnosis Date   • Anxiety    • Depression    • 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     Onset  "date: 7/30/2010    • Eczema    • GERD (gastroesophageal reflux disease)    • Hypertension    • Lyme disease     Onset date: 7/5/2011    • Uncomplicated alcohol abuse     Resolved 9/24/2014          Medications:     Current Outpatient Medications:   •  LORazepam (ATIVAN) 1 mg tablet, Take 1/2- 1 tablet as needed for anxiety attacks., Disp: 5 tablet, Rfl: 0  •  traZODone (DESYREL) 100 mg tablet, Take 1.5 tablets (150 mg total) by mouth daily at bedtime, Disp: 90 tablet, Rfl: 1  •  hydrocortisone (WESTCORT) 0.2 % cream, Apply topically 2 (two) times a day (Patient not taking: Reported on 12/25/2024), Disp: 60 g, Rfl: 3    No Known Allergies    OBJECTIVE  Vitals:   Vitals:    01/16/25 1629   BP: 110/80   BP Location: Left arm   Patient Position: Sitting   Cuff Size: Standard   Pulse: 65   Resp: 18   Temp: (!) 97.2 °F (36.2 °C)   TempSrc: Temporal   SpO2: 98%   Weight: 77.5 kg (170 lb 12.8 oz)   Height: 5' 9.5\" (1.765 m)         Physical Exam  Constitutional:       Appearance: Normal appearance.   Pulmonary:      Effort: Pulmonary effort is normal.   Musculoskeletal:         General: Normal range of motion.   Neurological:      General: No focal deficit present.      Mental Status: He is alert and oriented to person, place, and time.   Psychiatric:         Mood and Affect: Mood normal.         Behavior: Behavior normal.         Thought Content: Thought content normal.         Judgment: Judgment normal.                    Jennifer Mena MD,   [facility]  1/16/2025      "

## 2025-03-13 ENCOUNTER — OFFICE VISIT (OUTPATIENT)
Dept: FAMILY MEDICINE CLINIC | Facility: CLINIC | Age: 40
End: 2025-03-13
Payer: COMMERCIAL

## 2025-03-13 VITALS
BODY MASS INDEX: 24.62 KG/M2 | RESPIRATION RATE: 18 BRPM | DIASTOLIC BLOOD PRESSURE: 80 MMHG | TEMPERATURE: 97 F | HEART RATE: 75 BPM | SYSTOLIC BLOOD PRESSURE: 118 MMHG | OXYGEN SATURATION: 99 % | WEIGHT: 172 LBS | HEIGHT: 70 IN

## 2025-03-13 DIAGNOSIS — G47.00 INSOMNIA, UNSPECIFIED TYPE: ICD-10-CM

## 2025-03-13 DIAGNOSIS — F40.10 SOCIAL ANXIETY DISORDER: ICD-10-CM

## 2025-03-13 DIAGNOSIS — Z13.29 SCREENING FOR THYROID DISORDER: ICD-10-CM

## 2025-03-13 DIAGNOSIS — Z00.00 ANNUAL PHYSICAL EXAM: Primary | ICD-10-CM

## 2025-03-13 DIAGNOSIS — E78.5 HYPERLIPIDEMIA, UNSPECIFIED HYPERLIPIDEMIA TYPE: ICD-10-CM

## 2025-03-13 DIAGNOSIS — F41.8 DEPRESSION WITH ANXIETY: ICD-10-CM

## 2025-03-13 DIAGNOSIS — K40.90 LEFT INGUINAL HERNIA: ICD-10-CM

## 2025-03-13 DIAGNOSIS — I10 PRIMARY HYPERTENSION: ICD-10-CM

## 2025-03-13 PROCEDURE — 99396 PREV VISIT EST AGE 40-64: CPT | Performed by: FAMILY MEDICINE

## 2025-03-13 RX ORDER — VENLAFAXINE HYDROCHLORIDE 37.5 MG/1
37.5 CAPSULE, EXTENDED RELEASE ORAL
Qty: 30 CAPSULE | Refills: 5 | Status: SHIPPED | OUTPATIENT
Start: 2025-03-13

## 2025-03-13 RX ORDER — TRAZODONE HYDROCHLORIDE 100 MG/1
200 TABLET ORAL
Qty: 180 TABLET | Refills: 2 | Status: SHIPPED | OUTPATIENT
Start: 2025-03-13 | End: 2025-06-11

## 2025-03-13 RX ORDER — LORAZEPAM 1 MG/1
TABLET ORAL
Qty: 5 TABLET | Refills: 0 | Status: SHIPPED | OUTPATIENT
Start: 2025-03-13

## 2025-03-13 NOTE — PROGRESS NOTES
Adult Annual Physical  Name: Obed Bae      : 1985      MRN: [id number]  Encounter Provider: Jennifer Mena MD  Encounter Date: 3/13/2025   Encounter department: [facility]    Assessment & Plan  Annual physical exam         Insomnia, unspecified type  Continue on trazodone 100 mg  Orders:  •  traZODone (DESYREL) 100 mg tablet; Take 2 tablets (200 mg total) by mouth daily at bedtime    Social anxiety disorder  Will start the patient on Effexor 37.5 mg  We will see him back in 6 months if no improvement in the meantime to please contact our office and we can increase the dose  Orders:  •  LORazepam (ATIVAN) 1 mg tablet; Take 1/2- 1 tablet as needed for anxiety attacks.  •  venlafaxine (EFFEXOR-XR) 37.5 mg 24 hr capsule; Take 1 capsule (37.5 mg total) by mouth daily with breakfast    Screening for thyroid disorder    Orders:  •  TSH, 3rd generation with Free T4 reflex; Future    Primary hypertension  Has been stable since patient has not been drinking alcohol  Orders:  •  CBC and differential; Future  •  Comprehensive metabolic panel; Future    Left inguinal hernia  Status post; continue supportive care       Hyperlipidemia, unspecified hyperlipidemia type  Repeat levels  Orders:  •  Lipid panel; Future    Depression with anxiety    Orders:  •  venlafaxine (EFFEXOR-XR) 37.5 mg 24 hr capsule; Take 1 capsule (37.5 mg total) by mouth daily with breakfast    Preventive Screenings:    - Prostate cancer screening: screening not indicated          History of Present Illness     Adult Annual Physical:  Patient presents for annual physical. 39-year-old male presenting to the office.  Patient continues to be holding his sobriety.  Patient states the insomnia medications helping.  But still has anxiety and sometimes depression.  Patient is seeking some adjustment in medications if possible..     Diet and Physical Activity:  - Diet/Nutrition: well balanced diet.  - Exercise: no formal  "exercise.    General Health:  - Sleep: sleeps well.  - Hearing: normal hearing left ear and decreased hearing right ear.  - Vision: vision problems.  - Dental: no dental visits for > 1 year.    Review of Systems   Constitutional:  Negative for activity change, appetite change, chills, fatigue and fever.   HENT:  Negative for congestion.    Respiratory:  Negative for cough, chest tightness and shortness of breath.    Cardiovascular:  Negative for chest pain and leg swelling.   Gastrointestinal:  Negative for abdominal distention, abdominal pain, constipation, diarrhea, nausea and vomiting.   All other systems reviewed and are negative.    Current Outpatient Medications on File Prior to Visit   Medication Sig Dispense Refill   • hydrocortisone (WESTCORT) 0.2 % cream Apply topically 2 (two) times a day (Patient not taking: Reported on 3/13/2025) 60 g 3     No current facility-administered medications on file prior to visit.      Social History     Tobacco Use   • Smoking status: Every Day     Current packs/day: 1.00     Average packs/day: 1 pack/day for 10.0 years (10.0 ttl pk-yrs)     Types: Cigarettes     Passive exposure: Current   • Smokeless tobacco: Never   Vaping Use   • Vaping status: Never Used   Substance and Sexual Activity   • Alcohol use: Not Currently     Alcohol/week: 12.0 standard drinks of alcohol     Types: 12 Shots of liquor per week     Comment: stopped 1/5/2025   • Drug use: Yes     Types: Marijuana     Comment: occ   • Sexual activity: Not on file       Objective   /80 (BP Location: Left arm, Patient Position: Sitting, Cuff Size: Standard)   Pulse 75   Temp (!) 97 °F (36.1 °C) (Temporal)   Resp 18   Ht 5' 9.5\" (1.765 m)   Wt 78 kg (172 lb)   SpO2 99%   BMI 25.04 kg/m²     Physical Exam  Vitals reviewed.   Constitutional:       Appearance: He is well-developed.   HENT:      Head: Normocephalic and atraumatic.      Right Ear: Tympanic membrane, ear canal and external ear normal.      " Left Ear: Tympanic membrane, ear canal and external ear normal.      Nose: Nose normal.      Mouth/Throat:      Mouth: Mucous membranes are moist.   Eyes:      Conjunctiva/sclera: Conjunctivae normal.      Pupils: Pupils are equal, round, and reactive to light.   Cardiovascular:      Rate and Rhythm: Normal rate and regular rhythm.      Heart sounds: Normal heart sounds.   Pulmonary:      Effort: Pulmonary effort is normal.      Breath sounds: Normal breath sounds. No wheezing.   Abdominal:      General: Bowel sounds are normal. There is no distension.      Palpations: Abdomen is soft. There is no mass.      Tenderness: There is no abdominal tenderness.   Musculoskeletal:         General: No tenderness. Normal range of motion.      Cervical back: Normal range of motion and neck supple.   Skin:     General: Skin is warm.      Capillary Refill: Capillary refill takes less than 2 seconds.   Neurological:      Mental Status: He is alert and oriented to person, place, and time.      Cranial Nerves: No cranial nerve deficit.      Sensory: No sensory deficit.      Motor: No abnormal muscle tone.      Coordination: Coordination normal.      Deep Tendon Reflexes: Reflexes normal.   Psychiatric:         Behavior: Behavior normal.         Thought Content: Thought content normal.         Judgment: Judgment normal.

## 2025-03-13 NOTE — ASSESSMENT & PLAN NOTE
Has been stable since patient has not been drinking alcohol  Orders:  •  CBC and differential; Future  •  Comprehensive metabolic panel; Future

## 2025-03-13 NOTE — ASSESSMENT & PLAN NOTE
Continue on trazodone 100 mg  Orders:  •  traZODone (DESYREL) 100 mg tablet; Take 2 tablets (200 mg total) by mouth daily at bedtime

## 2025-03-17 NOTE — ASSESSMENT & PLAN NOTE
Orders:  •  venlafaxine (EFFEXOR-XR) 37.5 mg 24 hr capsule; Take 1 capsule (37.5 mg total) by mouth daily with breakfast

## 2025-06-02 ENCOUNTER — TELEPHONE (OUTPATIENT)
Dept: FAMILY MEDICINE CLINIC | Facility: CLINIC | Age: 40
End: 2025-06-02

## 2025-06-02 DIAGNOSIS — G47.00 INSOMNIA, UNSPECIFIED TYPE: ICD-10-CM

## 2025-06-02 RX ORDER — TRAZODONE HYDROCHLORIDE 100 MG/1
200 TABLET ORAL
Qty: 180 TABLET | Refills: 2 | Status: SHIPPED | OUTPATIENT
Start: 2025-06-02 | End: 2025-08-31

## 2025-06-02 NOTE — TELEPHONE ENCOUNTER
Requesting refill of sleep medication (Trazodone according to med list). Please send refill to [facility] - updated pharmacy info, should be primary pharmacy now

## 2025-07-06 ENCOUNTER — HOSPITAL ENCOUNTER (EMERGENCY)
Facility: HOSPITAL | Age: 40
Discharge: HOME | End: 2025-07-06
Attending: EMERGENCY MEDICINE | Admitting: EMERGENCY MEDICINE
Payer: COMMERCIAL

## 2025-07-06 VITALS
HEART RATE: 65 BPM | RESPIRATION RATE: 16 BRPM | SYSTOLIC BLOOD PRESSURE: 113 MMHG | DIASTOLIC BLOOD PRESSURE: 72 MMHG | TEMPERATURE: 98 F | OXYGEN SATURATION: 96 %

## 2025-07-06 DIAGNOSIS — T78.2XXA ANAPHYLAXIS, INITIAL ENCOUNTER: Primary | ICD-10-CM

## 2025-07-06 PROCEDURE — 96374 THER/PROPH/DIAG INJ IV PUSH: CPT

## 2025-07-06 PROCEDURE — 99284 EMERGENCY DEPT VISIT MOD MDM: CPT

## 2025-07-06 PROCEDURE — 93005 ELECTROCARDIOGRAM TRACING: CPT

## 2025-07-06 PROCEDURE — 99284 EMERGENCY DEPT VISIT MOD MDM: CPT | Performed by: EMERGENCY MEDICINE

## 2025-07-06 RX ORDER — EPINEPHRINE 0.3 MG/.3ML
0.3 INJECTION SUBCUTANEOUS ONCE AS NEEDED
Qty: 0.6 ML | Refills: 0 | Status: SHIPPED | OUTPATIENT
Start: 2025-07-06

## 2025-07-06 RX ORDER — FAMOTIDINE 10 MG/ML
20 INJECTION, SOLUTION INTRAVENOUS ONCE
Status: COMPLETED | OUTPATIENT
Start: 2025-07-06 | End: 2025-07-06

## 2025-07-06 RX ADMIN — FAMOTIDINE 20 MG: 10 INJECTION, SOLUTION INTRAVENOUS at 14:41

## 2025-07-06 NOTE — ED PROVIDER NOTES
Time reflects when diagnosis was documented in both MDM as applicable and the Disposition within this note       Time User Action Codes Description Comment    7/6/2025  4:00 PM Sarah Virk Add [T78.2XXA] Anaphylaxis, initial encounter           ED Disposition       ED Disposition   Discharge    Condition   Stable    Date/Time   Sun Jul 6, 2025  4:00 PM    Comment   Obed KRISTIN Bae discharge to home/self care.                   Assessment & Plan       Medical Decision Making  Pt is a 41yo M who presents with anaphylaxis.    Pt received appropriate medications prior to arrival and with improvement in symptoms. Will give pepcid and monitor for recurrence of symptoms. See ED course for results and details.    Plan to discharge pt with f/u to PCP. Discussed returning the ED with new or worsening of symptoms. Discussed use of over the counter medications as stated on the bottle as needed for symptoms. Discussed taking new medication only as needed. Pt expressed understanding of discharge instructions, return precautions, and medication instructions and is stable for discharge at this time. All questions were answered and pt was discharged without incident.         Risk  Prescription drug management.        ED Course as of 07/06/25 1624   Sun Jul 06, 2025   1600 2 hours post-epi. Pt states he is feeling better and is ready for DC.        Medications   Famotidine (PF) (PEPCID) injection 20 mg (20 mg Intravenous Given 7/6/25 1441)       ED Risk Strat Scores                    No data recorded                            History of Present Illness       Chief Complaint   Patient presents with    Allergic Reaction     Stung by wasp in leg , epi pen, 125 solumedrol, 50mg benadryl , 500cc fluid bolus given pre hospital pt states stung 45 min ago became nauseous , sob felt like he was going to pass out        Past Medical History[1]   Past Surgical History[2]   Family History[3]   Social History[4]   E-Cigarette/Vaping     E-Cigarette Use Never User       E-Cigarette/Vaping Substances    Nicotine No     THC No     CBD No     Flavoring No     Other No     Unknown No       I have reviewed and agree with the history as documented.     Pt is a 41yo M who presents for allergic reaction.  Patient reports that he was mowing the lawn when he was stung by a bee on his right posterior ankle.  Patient reports he jumped in the pool to get the bee off.  Patient reports when he got out of the pool he began to feel lightheaded, nauseous, short of breath and generally unwell.  Patient reports that symptoms continue to worsen and EMS was subsequently called.  EMS states that patient was pale and unwell appearing upon their arrival.  EMS gave epi, Solu-Medrol, Benadryl, and IV fluids with improvement.  Patient reports he is still feeling mildly short of breath but symptoms have otherwise resolved. Patient denies any previous allergic reactions.           Objective       ED Triage Vitals   Temperature Pulse Blood Pressure Respirations SpO2 Patient Position - Orthostatic VS   07/06/25 1430 07/06/25 1430 07/06/25 1430 07/06/25 1430 07/06/25 1430 07/06/25 1600   98 °F (36.7 °C) 88 142/79 22 100 % Sitting      Temp src Heart Rate Source BP Location FiO2 (%) Pain Score    -- 07/06/25 1600 07/06/25 1600 -- --     Monitor Right arm        Vitals      Date and Time Temp Pulse SpO2 Resp BP Pain Score FACES Pain Rating User   07/06/25 1600 -- 65 96 % 16 113/72 -- -- AM   07/06/25 1454 -- 72 99 % 23 -- -- -- RG   07/06/25 1430 98 °F (36.7 °C) 88 100 % 22 142/79 -- -- RG            Physical Exam  Vitals reviewed.   Constitutional:       General: He is not in acute distress.     Appearance: He is well-developed. He is not toxic-appearing or diaphoretic.   HENT:      Head: Normocephalic and atraumatic.      Right Ear: External ear normal.      Left Ear: External ear normal.      Nose: Nose normal.     Eyes:      Conjunctiva/sclera: Conjunctivae normal.      Pupils:  Pupils are equal, round, and reactive to light.       Cardiovascular:      Rate and Rhythm: Normal rate and regular rhythm.      Heart sounds: Normal heart sounds.   Pulmonary:      Effort: Pulmonary effort is normal. No respiratory distress.      Breath sounds: Normal breath sounds. No wheezing or rales.   Abdominal:      General: Bowel sounds are normal. There is no distension.      Palpations: Abdomen is soft.      Tenderness: There is no abdominal tenderness.     Musculoskeletal:         General: Normal range of motion.      Cervical back: Neck supple.     Skin:     General: Skin is warm and dry.      Capillary Refill: Capillary refill takes less than 2 seconds.      Coloration: Skin is pale.      Findings: No erythema or rash.     Neurological:      General: No focal deficit present.      Mental Status: He is alert and oriented to person, place, and time.      Comments: Shaking (likely 2/2 epi)   Psychiatric:         Speech: Speech normal.         Behavior: Behavior is cooperative.         Results Reviewed       None            No orders to display       Procedures    ED Medication and Procedure Management   Prior to Admission Medications   Prescriptions Last Dose Informant Patient Reported? Taking?   LORazepam (ATIVAN) 1 mg tablet   No No   Sig: Take 1/2- 1 tablet as needed for anxiety attacks.   hydrocortisone (WESTCORT) 0.2 % cream   No No   Sig: Apply topically 2 (two) times a day   Patient not taking: Reported on 3/13/2025   traZODone (DESYREL) 100 mg tablet   No No   Sig: Take 2 tablets (200 mg total) by mouth daily at bedtime   venlafaxine (EFFEXOR-XR) 37.5 mg 24 hr capsule   No No   Sig: Take 1 capsule (37.5 mg total) by mouth daily with breakfast      Facility-Administered Medications: None     Discharge Medication List as of 7/6/2025  4:02 PM        START taking these medications    Details   EPINEPHrine (EPIPEN) 0.3 mg/0.3 mL SOAJ Inject 0.3 mL (0.3 mg total) into a muscle once as needed for  anaphylaxis for up to 1 dose, Starting Sun 7/6/2025, Normal           CONTINUE these medications which have NOT CHANGED    Details   hydrocortisone (WESTCORT) 0.2 % cream Apply topically 2 (two) times a day, Starting Fri 10/27/2023, Normal      LORazepam (ATIVAN) 1 mg tablet Take 1/2- 1 tablet as needed for anxiety attacks., Normal      traZODone (DESYREL) 100 mg tablet Take 2 tablets (200 mg total) by mouth daily at bedtime, Starting Mon 6/2/2025, Until Sun 8/31/2025, Normal      venlafaxine (EFFEXOR-XR) 37.5 mg 24 hr capsule Take 1 capsule (37.5 mg total) by mouth daily with breakfast, Starting Thu 3/13/2025, Normal           No discharge procedures on file.  ED SEPSIS DOCUMENTATION   Time reflects when diagnosis was documented in both MDM as applicable and the Disposition within this note       Time User Action Codes Description Comment    7/6/2025  4:00 PM Sarah Virk Add [T78.2XXA] Anaphylaxis, initial encounter                    [1]   Past Medical History:  Diagnosis Date    Anxiety     Depression     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     Onset date: 7/30/2010     Eczema     GERD (gastroesophageal reflux disease)     Hypertension     Lyme disease     Onset date: 7/5/2011     Uncomplicated alcohol abuse     Resolved 9/24/2014    [2]   Past Surgical History:  Procedure Laterality Date    APPENDECTOMY      MD RPR 1ST INGUN HRNA AGE 4 YRS/> REDUCIBLE Left 12/20/2024    Procedure: REPAIR HERNIA INGUINAL;  Surgeon: Viet Auguste MD;  Location: [facility];  Service: General    MD RPR AA HERNIA 1ST < 3 CM REDUCIBLE N/A 9/6/2024    Procedure: REPAIR HERNIA UMBILICAL;  Surgeon: Viet Auguste MD;  Location: [facility];  Service: General    SINUS SURGERY      TONSILLECTOMY     [3] No family history on file.  [4]   Social History  Tobacco Use    Smoking status: Every Day     Current packs/day: 1.00     Average packs/day: 1 pack/day for 10.0 years (10.0 ttl pk-yrs)     Types: Cigarettes     Passive exposure:  Current    Smokeless tobacco: Never   Vaping Use    Vaping status: Never Used   Substance Use Topics    Alcohol use: Not Currently     Alcohol/week: 12.0 standard drinks of alcohol     Types: 12 Shots of liquor per week     Comment: stopped 1/5/2025    Drug use: Yes     Types: Marijuana     Comment: ade Virk MD  07/06/25 3549

## 2025-07-06 NOTE — DISCHARGE INSTRUCTIONS
Follow-up with primary care for further care. Contact info provided below if needed.  Use over the counter medications as stated on the bottle as needed for symptom control.  Use prescribed epi-pen if needed and then proceed to the ED.   Return to the ED with new or worsening symptoms.

## 2025-07-07 LAB
ATRIAL RATE: 94 BPM
P AXIS: 73 DEGREES
PR INTERVAL: 152 MS
QRS AXIS: 67 DEGREES
QRSD INTERVAL: 92 MS
QT INTERVAL: 382 MS
QTC INTERVAL: 477 MS
T WAVE AXIS: 59 DEGREES
VENTRICULAR RATE: 94 BPM

## 2025-07-07 PROCEDURE — 93010 ELECTROCARDIOGRAM REPORT: CPT | Performed by: INTERNAL MEDICINE

## 2025-07-09 ENCOUNTER — OFFICE VISIT (OUTPATIENT)
Dept: FAMILY MEDICINE CLINIC | Facility: CLINIC | Age: 40
End: 2025-07-09
Payer: COMMERCIAL

## 2025-07-09 VITALS
WEIGHT: 162.8 LBS | TEMPERATURE: 97.9 F | BODY MASS INDEX: 23.31 KG/M2 | RESPIRATION RATE: 14 BRPM | OXYGEN SATURATION: 97 % | HEIGHT: 70 IN | DIASTOLIC BLOOD PRESSURE: 66 MMHG | HEART RATE: 77 BPM | SYSTOLIC BLOOD PRESSURE: 104 MMHG

## 2025-07-09 DIAGNOSIS — M62.838 NECK MUSCLE SPASM: Primary | ICD-10-CM

## 2025-07-09 DIAGNOSIS — Z91.030 BEE STING ALLERGY: ICD-10-CM

## 2025-07-09 PROCEDURE — 99214 OFFICE O/P EST MOD 30 MIN: CPT | Performed by: FAMILY MEDICINE

## 2025-07-09 RX ORDER — CYCLOBENZAPRINE HCL 10 MG
10 TABLET ORAL
Qty: 30 TABLET | Refills: 0 | Status: SHIPPED | OUTPATIENT
Start: 2025-07-09

## 2025-07-09 NOTE — PROGRESS NOTES
Name: Obed Bae      : 1985      MRN: [id number]  Encounter Provider: Jennifer Mena MD  Encounter Date: 2025   Encounter department: [facility]    Assessment & Plan  Neck muscle spasm  To be used as needed.  Has tolerated this medication in the past  Orders:  •  cyclobenzaprine (FLEXERIL) 10 mg tablet; Take 1 tablet (10 mg total) by mouth daily at bedtime    Bee sting allergy  I do recommend having at the house Benadryl and Pepcid.  I do recommend him starting to take Allegra daily for the next couple weeks given that I still feel that there is some inflammation in his neck            History of Present Illness     HPI presenting for an ER follow-up.  Mowing the grass over the weekend  Right leg stung by a bee; this is not the patient's first bee sting.  But unfortunately the first time he has had anaphylaxis before.  Feeling hot jumped in the pool; felt odd. Couldn't breath and vomiting spells.  Mom-> was present.   Was given Epi pen, Solumedrol, benadryl, 500 cc of Fluid.  15 mins after he got bite.  Patient states that he was in route through the ambulance and started feeling better.  In the hospital they gave him 1 dose of Pepcid and said some home with an EpiPen.    Review of Systems   Constitutional:  Negative for activity change, appetite change, chills, fatigue and fever.   HENT:  Positive for sore throat. Negative for congestion.    Respiratory:  Negative for cough, chest tightness and shortness of breath.    Cardiovascular:  Negative for chest pain and leg swelling.   Gastrointestinal:  Negative for abdominal distention, abdominal pain, constipation, diarrhea, nausea and vomiting.   Musculoskeletal:  Positive for neck pain.   All other systems reviewed and are negative.    Past Medical History[1]  Past Surgical History[2]  Family History[3]  Social History[4]  Medications[5]  Allergies   Allergen Reactions   • Bee Venom Anaphylaxis     Immunization  "History   Administered Date(s) Administered   • COVID-19 PFIZER VACCINE 0.3 ML IM 05/04/2021, 06/04/2021     Objective   /66 (BP Location: Right arm, Patient Position: Sitting, Cuff Size: Standard)   Pulse 77   Temp 97.9 °F (36.6 °C) (Temporal)   Resp 14   Ht 5' 9.5\" (1.765 m)   Wt 73.8 kg (162 lb 12.8 oz)   SpO2 97%   BMI 23.70 kg/m²     Physical Exam  Constitutional:       Appearance: He is well-developed.   HENT:      Head: Normocephalic and atraumatic.      Mouth/Throat:      Pharynx: Posterior oropharyngeal erythema present.     Eyes:      Conjunctiva/sclera: Conjunctivae normal.      Pupils: Pupils are equal, round, and reactive to light.     Neck:      Comments: Bilateral neck tendernessPulmonary:      Effort: Pulmonary effort is normal.     Neurological:      Mental Status: He is alert and oriented to person, place, and time.     Psychiatric:         Behavior: Behavior normal.         Thought Content: Thought content normal.         Judgment: Judgment normal.                [1]  Past Medical History:  Diagnosis Date   • Anxiety    • Depression    • 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     Onset date: 7/30/2010    • Eczema    • GERD (gastroesophageal reflux disease)    • Hypertension    • Lyme disease     Onset date: 7/5/2011    • Uncomplicated alcohol abuse     Resolved 9/24/2014    [2]  Past Surgical History:  Procedure Laterality Date   • APPENDECTOMY     • HERNIA REPAIR     • [place] RPR 1ST INGUN HRNA AGE 4 YRS/> REDUCIBLE Left 12/20/2024    Procedure: REPAIR HERNIA INGUINAL;  Surgeon: Viet Auguste MD;  Location: [facility];  Service: General   • [place] RPR AA HERNIA 1ST < 3 CM REDUCIBLE N/A 09/06/2024    Procedure: REPAIR HERNIA UMBILICAL;  Surgeon: Viet Auguste MD;  Location: [facility];  Service: General   • SINUS SURGERY     • TONSILLECTOMY     [3]  No family history on file.[4]  Social History  Tobacco Use   • Smoking status: Every Day     Average packs/day: 1 pack/day for 10.0 years (10.0 ttl " pk-yrs)     Types: Cigarettes     Start date: 2014     Passive exposure: Current   • Smokeless tobacco: Never   Vaping Use   • Vaping status: Never Used   Substance and Sexual Activity   • Alcohol use: Not Currently     Alcohol/week: 12.0 standard drinks of alcohol     Types: 12 Shots of liquor per week     Comment: stopped 1/5/2025   • Drug use: Yes     Types: Marijuana     Comment: occ   [5]  Current Outpatient Medications on File Prior to Visit   Medication Sig   • EPINEPHrine (EPIPEN) 0.3 mg/0.3 mL SOAJ Inject 0.3 mL (0.3 mg total) into a muscle once as needed for anaphylaxis for up to 1 dose   • traZODone (DESYREL) 100 mg tablet Take 2 tablets (200 mg total) by mouth daily at bedtime   • venlafaxine (EFFEXOR-XR) 37.5 mg 24 hr capsule Take 1 capsule (37.5 mg total) by mouth daily with breakfast   • LORazepam (ATIVAN) 1 mg tablet Take 1/2- 1 tablet as needed for anxiety attacks. (Patient not taking: Reported on 7/9/2025)   • [DISCONTINUED] hydrocortisone (WESTCORT) 0.2 % cream Apply topically 2 (two) times a day (Patient not taking: Reported on 12/25/2024)

## 2025-07-10 ENCOUNTER — TELEPHONE (OUTPATIENT)
Age: 40
End: 2025-07-10

## 2025-07-10 NOTE — TELEPHONE ENCOUNTER
Gilda called regarding Obed to find out where his medication was sent to. She thought it was [facility] but it was sent to [facility].

## (undated) DEVICE — SUT MONOCRYL 4-0 PS-2 27 IN Y426H

## (undated) DEVICE — ASTOUND STANDARD SURGICAL GOWN, XL: Brand: CONVERTORS

## (undated) DEVICE — PACK GENERAL LF

## (undated) DEVICE — NEPTUNE E-SEP SMOKE EVACUATION PENCIL, COATED, 70MM BLADE, PUSH BUTTON SWITCH: Brand: NEPTUNE E-SEP

## (undated) DEVICE — STRL PENROSE DRAIN 18" X 1/4": Brand: CARDINAL HEALTH

## (undated) DEVICE — SUT PROLENE 2-0 RB-1/RB-1 36 IN 8559H

## (undated) DEVICE — DRAPE UTILITY

## (undated) DEVICE — INTENDED FOR TISSUE SEPARATION, AND OTHER PROCEDURES THAT REQUIRE A SHARP SURGICAL BLADE TO PUNCTURE OR CUT.: Brand: BARD-PARKER SAFETY BLADES SIZE 15, STERILE

## (undated) DEVICE — TOWEL SURG XR DETECT GREEN STRL RFD

## (undated) DEVICE — STERILE DOUBLE BASIN SET PACK: Brand: CARDINAL HEALTH

## (undated) DEVICE — NEEDLE 25G X 1 1/2

## (undated) DEVICE — SUT VICRYL 3-0 18 IN J904T

## (undated) DEVICE — ADHESIVE SKIN HIGH VISCOSITY EXOFIN 1ML

## (undated) DEVICE — SPONGE: SPECIALTY K-DISS XR  100/CS: Brand: MEDICAL ACTION INDUSTRIES

## (undated) DEVICE — ANTIBACTERIAL UNDYED BRAIDED (POLYGLACTIN 910), SYNTHETIC ABSORBABLE SUTURE: Brand: COATED VICRYL

## (undated) DEVICE — CHLORAPREP HI-LITE 26ML ORANGE

## (undated) DEVICE — SUT SILK 2-0 SH 30 IN K833H

## (undated) DEVICE — GLOVE SRG BIOGEL 8

## (undated) DEVICE — GLOVE INDICATOR PI UNDERGLOVE SZ 7.5 BLUE

## (undated) DEVICE — SUT ETHIBOND 0 CT-2 30 IN X412H

## (undated) DEVICE — EXOFIN PRECISION PEN HIGH VISCOSITY TOPICAL SKIN ADHESIVE: Brand: EXOFIN PRECISION PEN, 1G

## (undated) DEVICE — SYRINGE 10ML LL CONTROL TOP

## (undated) DEVICE — OR2 STERILE LABELS: Brand: CENTURION

## (undated) DEVICE — TIBURON LAPAROTOMY DRAPE: Brand: CONVERTORS

## (undated) DEVICE — SUT SILK 2-0 30 IN SA85H

## (undated) DEVICE — DRAPE LAPAROTOMY W/POUCHES